# Patient Record
Sex: FEMALE | Race: WHITE | NOT HISPANIC OR LATINO | Employment: OTHER | URBAN - METROPOLITAN AREA
[De-identification: names, ages, dates, MRNs, and addresses within clinical notes are randomized per-mention and may not be internally consistent; named-entity substitution may affect disease eponyms.]

---

## 2017-03-09 ENCOUNTER — RADIANT APPOINTMENT (OUTPATIENT)
Dept: MAMMOGRAPHY | Facility: CLINIC | Age: 47
End: 2017-03-09
Attending: FAMILY MEDICINE
Payer: COMMERCIAL

## 2017-03-09 DIAGNOSIS — Z12.31 VISIT FOR SCREENING MAMMOGRAM: ICD-10-CM

## 2017-03-09 PROCEDURE — G0202 SCR MAMMO BI INCL CAD: HCPCS | Mod: TC

## 2017-03-09 NOTE — PROGRESS NOTES
Steffanie Ahn  Is out of the office and I am reviewing your results  Your mammogram was normal.  I do recommend annual mammography.   Please call or MyChart my office with any questions or concerns.    Татьяна Pizano, PAC

## 2017-03-13 ENCOUNTER — OFFICE VISIT (OUTPATIENT)
Dept: FAMILY MEDICINE | Facility: CLINIC | Age: 47
End: 2017-03-13
Payer: COMMERCIAL

## 2017-03-13 VITALS
HEART RATE: 64 BPM | BODY MASS INDEX: 27.08 KG/M2 | TEMPERATURE: 98.2 F | HEIGHT: 68 IN | WEIGHT: 178.7 LBS | RESPIRATION RATE: 16 BRPM | OXYGEN SATURATION: 100 % | SYSTOLIC BLOOD PRESSURE: 112 MMHG | DIASTOLIC BLOOD PRESSURE: 78 MMHG

## 2017-03-13 DIAGNOSIS — N30.90 RECURRENT CYSTITIS: ICD-10-CM

## 2017-03-13 DIAGNOSIS — Z12.4 SCREENING FOR MALIGNANT NEOPLASM OF CERVIX: ICD-10-CM

## 2017-03-13 DIAGNOSIS — F32.5 MAJOR DEPRESSION IN COMPLETE REMISSION (H): ICD-10-CM

## 2017-03-13 DIAGNOSIS — Z00.00 ROUTINE GENERAL MEDICAL EXAMINATION AT A HEALTH CARE FACILITY: Primary | ICD-10-CM

## 2017-03-13 PROCEDURE — 87624 HPV HI-RISK TYP POOLED RSLT: CPT | Performed by: FAMILY MEDICINE

## 2017-03-13 PROCEDURE — G0145 SCR C/V CYTO,THINLAYER,RESCR: HCPCS | Performed by: FAMILY MEDICINE

## 2017-03-13 PROCEDURE — 99396 PREV VISIT EST AGE 40-64: CPT | Performed by: FAMILY MEDICINE

## 2017-03-13 RX ORDER — CEPHALEXIN 500 MG/1
500 CAPSULE ORAL 3 TIMES DAILY
Qty: 30 CAPSULE | Refills: 5 | Status: SHIPPED | OUTPATIENT
Start: 2017-03-13 | End: 2018-03-13

## 2017-03-13 RX ORDER — CITALOPRAM HYDROBROMIDE 40 MG/1
40 TABLET ORAL DAILY
Qty: 90 TABLET | Refills: 3 | Status: SHIPPED | OUTPATIENT
Start: 2017-03-13 | End: 2018-03-13

## 2017-03-13 RX ORDER — CEPHALEXIN 500 MG/1
CAPSULE ORAL PRN
COMMUNITY
End: 2017-07-07

## 2017-03-13 ASSESSMENT — PAIN SCALES - GENERAL: PAINLEVEL: NO PAIN (0)

## 2017-03-13 NOTE — PROGRESS NOTES
SUBJECTIVE:     CC: Ashly Tan is an 47 year old woman who presents for preventive health visit.     [facility] Hpi Annual Exam   Question    3/10/2017  6:56 AM CST   Please respond to the following questions regarding your annual exam.  These will be reviewed by your provider at the time of your appointment and will become a permanent part of your medical record.      Do you get at least 3 servings of foods that have calcium each day (dairy, green leafy vegetables, etc)?  Yes   Have you had an eye exam in the past two years?  Yes   Do you see a dentist twice per year?  Yes   Do you have sleep apnea, excessive snoring or daytime drowsiness?  Daytime drowsiness   Do you have a special diet?   regular (no restrictions)   Outside of work, how many days during the week do you exercise?  2-3 days/week   Do you have any problems taking medications regularly?  No   Side effects from medication:  None   Do you have any additional concerns to address?  No   Have there been any changes to your medical or surgical history?  No   Have there been any changes to your social history? (tobacco use, alcohol use, sexual activity)  No   Have there been any changes in your allergies?  No   Have there been any changes or updates to the medications you take?  No   Do any of your medications need to be refilled?  Yes   Over the past 2 weeks, how often have you been bothered by any of the following problems?     Little interest or pleasure in doing things  Not at all   Feeling down, depressed or hopeless  Several days   PHQ-2 Score (range: 0 - 6)  1   Outside of work, approximately how many minutes a day do you exercise?  Greater than 60 minutes         Today's PHQ-2 Score:   PHQ-2 ( 1999 [employer]) 3/10/2017 3/7/2016   Q1: Little interest or pleasure in doing things - 0   Q2: Feeling down, depressed or hopeless - 0   PHQ-2 Score - 0   Little interest or pleasure in doing things Not at all -   Feeling down, depressed or hopeless Several  days -   PHQ-2 Score 1 -       Abuse: Current or Past(Physical, Sexual or Emotional)- No  Do you feel safe in your environment - Yes    Social History   Substance Use Topics     Smoking status: Never Smoker     Smokeless tobacco: Never Used     Alcohol use Yes     The patient does not drink >3 drinks per day nor >7 drinks per week.    Recent Labs   Lab Test  01/15/10   1133   CHOL  159   HDL  61   LDL  84   TRIG  69   CHOLHDLRATIO  2.6       Reviewed orders with patient.  Reviewed health maintenance and updated orders accordingly - Yes    Mammo Decision Support:  Patient under age 50, mutual decision reflected in health maintenance.      Pertinent mammograms are reviewed under the imaging tab.  History of abnormal Pap smear: NO - age 30-65 PAP every 5 years with negative HPV co-testing recommended    Reviewed and updated as needed this visit by clinical staff  Tobacco  Allergies  Meds  Med Hx  Surg Hx  Fam Hx  Soc Hx        Reviewed and updated as needed this visit by Provider        Here today for routine checkup  Doing well.  Reports no interval health concerns.    Her only issue is the start of the perimenopause. Cycle still regular but starting to have some mood changes. Trying some over-the-counter vitamin supplements    ROS:  C: NEGATIVE for fever, chills, change in weight  I: NEGATIVE for worrisome rashes, moles or lesions  E: NEGATIVE for vision changes or irritation  ENT: NEGATIVE for ear, mouth and throat problems  R: NEGATIVE for significant cough or SOB  B: NEGATIVE for masses, tenderness or discharge  CV: NEGATIVE for chest pain, palpitations or peripheral edema  GI: NEGATIVE for nausea, abdominal pain, heartburn, or change in bowel habits  : NEGATIVE for unusual urinary or vaginal symptoms. Periods are regular.  M: NEGATIVE for significant arthralgias or myalgia  N: NEGATIVE for weakness, dizziness or paresthesias  P: NEGATIVE for changes in mood or affect    Problem list, Medication list,  "Allergies, and Medical/Social/Surgical histories reviewed in [facility] and updated as appropriate.  Labs reviewed in EPIC  BP Readings from Last 3 Encounters:   03/13/17 112/78   03/07/16 116/72   01/06/16 118/74    Wt Readings from Last 3 Encounters:   03/13/17 81.1 kg (178 lb 11.2 oz)   03/07/16 81 kg (178 lb 8 oz)   01/06/16 80.1 kg (176 lb 8 oz)                  OBJECTIVE:     /78 (BP Location: Right arm, Patient Position: Right side, Cuff Size: Adult Regular)  Pulse 64  Temp 98.2  F (36.8  C) (Oral)  Resp 16  Ht 1.72 m (5' 7.7\")  Wt 81.1 kg (178 lb 11.2 oz)  LMP 02/26/2017 (Exact Date)  SpO2 100%  BMI 27.41 kg/m2  EXAM:  GENERAL: healthy, alert and no distress  EYES: Eyes grossly normal to inspection, PERRL and conjunctivae and sclerae normal  HENT: ear canals and TM's normal, nose and mouth without ulcers or lesions  NECK: no adenopathy, no asymmetry, masses, or scars and thyroid normal to palpation  RESP: lungs clear to auscultation - no rales, rhonchi or wheezes  CV: regular rate and rhythm, normal S1 S2, no S3 or S4, no murmur, click or rub, no peripheral edema and peripheral pulses strong  ABDOMEN: soft, nontender, no hepatosplenomegaly, no masses and bowel sounds normal   (female): normal female external genitalia, normal urethral meatus, vaginal mucosa pink, moist, well rugated, and normal cervix/adnexa/uterus without masses or discharge  MS: no gross musculoskeletal defects noted, no edema  SKIN: no suspicious lesions or rashes  NEURO: Normal strength and tone, mentation intact and speech normal  PSYCH: mentation appears normal, affect normal/bright    ASSESSMENT/PLAN:     1. Routine general medical examination at a health care facility  Health care maintenance up to date otherwise   - Lipid panel reflex to direct LDL; Future  - GLUCOSE; Future    2. Screening for malignant neoplasm of cervix    - Pap imaged thin layer screen with HPV - recommended age 30 - 65 years (select HPV order below)  - HPV " "High Risk Types DNA Cervical    3. Major depression in complete remission (H)  Doing well on current dosage. We'll continue for now. If menopausal symptoms worsen could consider changing to or adding Effexor  - citalopram (CELEXA) 40 MG tablet; Take 1 tablet (40 mg) by mouth daily  Dispense: 90 tablet; Refill: 3    4. Recurrent cystitis    - cephALEXin (KEFLEX) 500 MG capsule; Take 1 capsule (500 mg) by mouth 3 times daily  Dispense: 30 capsule; Refill: 5    COUNSELING:   Reviewed preventive health counseling, as reflected in patient instructions       Regular exercise       Healthy diet/nutrition       Vision screening         reports that she has never smoked. She has never used smokeless tobacco.    Estimated body mass index is 27.41 kg/(m^2) as calculated from the following:    Height as of this encounter: 1.72 m (5' 7.7\").    Weight as of this encounter: 81.1 kg (178 lb 11.2 oz).       Counseling Resources:  ATP IV Guidelines  Pooled Cohorts Equation Calculator  Breast Cancer Risk Calculator  FRAX Risk Assessment  ICSI Preventive Guidelines  Dietary Guidelines for Americans, 2010  DXY's MyPlate  ASA Prophylaxis  Lung CA Screening    Amber Ahn MD  [facility]  Answers for HPI/ROS submitted by the patient on 3/10/2017   Annual Exam:  Getting at least 3 servings of Calcium per day:: Yes  Bi-annual eye exam:: Yes  Dental care twice a year:: Yes  Sleep apnea or symptoms of sleep apnea:: Daytime drowsiness  Diet:: Regular (no restrictions)  Frequency of exercise:: 2-3 days/week  Taking medications regularly:: Yes  Medication side effects:: None  Additional concerns today:: No  Q1: Little interest or pleasure in doing things: 0=Not at all  Q2: Feeling down, depressed or hopeless: 1=Several days  PHQ-2 Score: 1  Duration of exercise:: Greater than 60 minutes    "

## 2017-03-13 NOTE — MR AVS SNAPSHOT
After Visit Summary   3/13/2017    Ashly Tan    MRN: [id number]           Patient Information     Date Of Birth          1970        Visit Information        Provider Department      3/13/2017 2:00 PM Amber Ahn MD [facility]        Today's Diagnoses     Routine general medical examination at a health care facility    -  1    Screening for malignant neoplasm of cervix        Major depression in complete remission (H)        Recurrent cystitis          Care Instructions      Preventive Health Recommendations  Female Ages 40 to 49    Yearly exam:     See your health care provider every year in order to  1. Review health changes.   2. Discuss preventive care.    3. Review your medicines if your doctor prescribed any.      Get a Pap test every three years (unless you have an abnormal result and your provider advises testing more often).      If you get Pap tests with HPV test, you only need to test every 5 years, unless you have an abnormal result. You do not need a Pap test if your uterus was removed (hysterectomy) and you have not had cancer.      You should be tested each year for STDs (sexually transmitted diseases), if you're at risk.       Ask your doctor if you should have a mammogram.      Have a colonoscopy (test for colon cancer) if someone in your family has had colon cancer or polyps before age 50.       Have a cholesterol test every 5 years.       Have a diabetes test (fasting glucose) after age 45. If you are at risk for diabetes, you should have this test every 3 years.    Shots: Get a flu shot each year. Get a tetanus shot every 10 years.     Nutrition:     Eat at least 5 servings of fruits and vegetables each day.    Eat whole-grain bread, whole-wheat pasta and brown rice instead of white grains and rice.    Talk to your provider about Calcium and Vitamin D.     Lifestyle    Exercise at least 150 minutes a week (an average of 30 minutes a day, 5  days a week). This will help you control your weight and prevent disease.    Limit alcohol to one drink per day.    No smoking.     Wear sunscreen to prevent skin cancer.    See your dentist every six months for an exam and cleaning.        Follow-ups after your visit        Follow-up notes from your care team     Return in about 1 year (around 3/13/2018).      Future tests that were ordered for you today     Open Future Orders        Priority Expected Expires Ordered    Lipid panel reflex to direct LDL Routine 3/14/2017 8/13/2017 3/13/2017    GLUCOSE Routine 3/14/2017 8/13/2017 3/13/2017            Who to contact     If you have questions or need follow up information about today's clinic visit or your schedule please contact [facility] directly at [phone].  Normal or non-critical lab and imaging results will be communicated to you by MyChart, letter or phone within 4 business days after the clinic has received the results. If you do not hear from us within 7 days, please contact the clinic through [employer] or phone. If you have a critical or abnormal lab result, we will notify you by phone as soon as possible.  Submit refill requests through [employer] or call your pharmacy and they will forward the refill request to us. Please allow 3 business days for your refill to be completed.          Additional Information About Your Visit        [employer] Information     [employer] gives you secure access to your electronic health record. If you see a primary care provider, you can also send messages to your care team and make appointments. If you have questions, please call your primary care clinic.  If you do not have a primary care provider, please call [phone] and they will assist you.        Care EveryWhere ID     This is your Care EveryWhere ID. This could be used by other organizations to access your [facility] medical records  [id number]        Your Vitals Were     Pulse Temperature Respirations  "Height Last Period Pulse Oximetry    64 98.2  F (36.8  C) (Oral) 16 1.72 m (5' 7.7\") 02/26/2017 (Exact Date) 100%    BMI (Body Mass Index)                   27.41 kg/m2            Blood Pressure from Last 3 Encounters:   03/13/17 112/78   03/07/16 116/72   01/06/16 118/74    Weight from Last 3 Encounters:   03/13/17 81.1 kg (178 lb 11.2 oz)   03/07/16 81 kg (178 lb 8 oz)   01/06/16 80.1 kg (176 lb 8 oz)              We Performed the Following     DEPRESSION ACTION PLAN (DAP) Order [[id number]]     HPV High Risk Types DNA Cervical     Pap imaged thin layer screen with HPV - recommended age 30 - 65 years (select HPV order below)          Today's Medication Changes          These changes are accurate as of: 3/13/17  3:19 PM.  If you have any questions, ask your nurse or doctor.               These medicines have changed or have updated prescriptions.        Dose/Directions    * cephALEXin 500 MG capsule   Commonly known as:  KEFLEX   This may have changed:  Another medication with the same name was added. Make sure you understand how and when to take each.   Changed by:  Amber Ahn MD        Take by mouth as needed   Refills:  0       * cephALEXin 500 MG capsule   Commonly known as:  KEFLEX   This may have changed:  You were already taking a medication with the same name, and this prescription was added. Make sure you understand how and when to take each.   Used for:  Recurrent cystitis   Changed by:  Amber Ahn MD        Dose:  500 mg   Take 1 capsule (500 mg) by mouth 3 times daily   Quantity:  30 capsule   Refills:  5       * Notice:  This list has 2 medication(s) that are the same as other medications prescribed for you. Read the directions carefully, and ask your doctor or other care provider to review them with you.         Where to get your medicines      These medications were sent to [facility] - [place], [place] - [address]  [address], [place] " [place] [address]     Phone:  [phone]     cephALEXin 500 MG capsule    citalopram 40 MG tablet                Primary Care Provider Office Phone # Fax #    Amber Vladimir Ahn [phone]       [facility] [address] [place] [id number]        Thank you!     Thank you for choosing [facility]  for your care. Our goal is always to provide you with excellent care. Hearing back from our patients is one way we can continue to improve our services. Please take a few minutes to complete the written survey that you may receive in the mail after your visit with us. Thank you!             Your Updated Medication List - Protect others around you: Learn how to safely use, store and throw away your medicines at www.disposemymeds.org.          This list is accurate as of: 3/13/17  3:19 PM.  Always use your most recent med list.                   Brand Name Dispense Instructions for use    ADVIL COLD/SINUS PO      prn       ALPRAZolam 0.5 MG tablet    XANAX    60 tablet    Take 1 tablet (0.5 mg) by mouth 3 times daily as needed for anxiety       * cephALEXin 500 MG capsule    KEFLEX     Take by mouth as needed       * cephALEXin 500 MG capsule    KEFLEX    30 capsule    Take 1 capsule (500 mg) by mouth 3 times daily       citalopram 40 MG tablet    celeXA    90 tablet    Take 1 tablet (40 mg) by mouth daily       * Notice:  This list has 2 medication(s) that are the same as other medications prescribed for you. Read the directions carefully, and ask your doctor or other care provider to review them with you.

## 2017-03-13 NOTE — NURSING NOTE
"Chief Complaint   Patient presents with     Physical     non-fasting       Initial /78 (BP Location: Right arm, Patient Position: Right side, Cuff Size: Adult Regular)  Pulse 64  Temp 98.2  F (36.8  C) (Oral)  Resp 16  Ht 1.72 m (5' 7.7\")  Wt 81.1 kg (178 lb 11.2 oz)  LMP 02/26/2017 (Exact Date)  SpO2 100%  BMI 27.41 kg/m2 Estimated body mass index is 27.41 kg/(m^2) as calculated from the following:    Height as of this encounter: 1.72 m (5' 7.7\").    Weight as of this encounter: 81.1 kg (178 lb 11.2 oz).  Medication Reconciliation: complete     Will Kamlesh MEJIA       "

## 2017-03-15 LAB
COPATH REPORT: NORMAL
PAP: NORMAL

## 2017-03-17 LAB
FINAL DIAGNOSIS: NORMAL
HPV HR 12 DNA CVX QL NAA+PROBE: NEGATIVE
HPV16 DNA SPEC QL NAA+PROBE: NEGATIVE
HPV18 DNA SPEC QL NAA+PROBE: NEGATIVE
SPECIMEN DESCRIPTION: NORMAL

## 2017-07-07 ENCOUNTER — OFFICE VISIT (OUTPATIENT)
Dept: FAMILY MEDICINE | Facility: CLINIC | Age: 47
End: 2017-07-07
Payer: COMMERCIAL

## 2017-07-07 VITALS
HEART RATE: 62 BPM | RESPIRATION RATE: 16 BRPM | DIASTOLIC BLOOD PRESSURE: 80 MMHG | SYSTOLIC BLOOD PRESSURE: 116 MMHG | OXYGEN SATURATION: 100 % | BODY MASS INDEX: 27.01 KG/M2 | HEIGHT: 68 IN | TEMPERATURE: 98.2 F | WEIGHT: 178.2 LBS

## 2017-07-07 DIAGNOSIS — N63.0 LUMP OR MASS IN BREAST: Primary | ICD-10-CM

## 2017-07-07 PROCEDURE — 99213 OFFICE O/P EST LOW 20 MIN: CPT | Performed by: FAMILY MEDICINE

## 2017-07-07 ASSESSMENT — ANXIETY QUESTIONNAIRES
IF YOU CHECKED OFF ANY PROBLEMS ON THIS QUESTIONNAIRE, HOW DIFFICULT HAVE THESE PROBLEMS MADE IT FOR YOU TO DO YOUR WORK, TAKE CARE OF THINGS AT HOME, OR GET ALONG WITH OTHER PEOPLE: NOT DIFFICULT AT ALL
7. FEELING AFRAID AS IF SOMETHING AWFUL MIGHT HAPPEN: NOT AT ALL
6. BECOMING EASILY ANNOYED OR IRRITABLE: NOT AT ALL
2. NOT BEING ABLE TO STOP OR CONTROL WORRYING: NOT AT ALL
5. BEING SO RESTLESS THAT IT IS HARD TO SIT STILL: NOT AT ALL
1. FEELING NERVOUS, ANXIOUS, OR ON EDGE: NOT AT ALL
GAD7 TOTAL SCORE: 0
3. WORRYING TOO MUCH ABOUT DIFFERENT THINGS: NOT AT ALL

## 2017-07-07 ASSESSMENT — PATIENT HEALTH QUESTIONNAIRE - PHQ9: 5. POOR APPETITE OR OVEREATING: NOT AT ALL

## 2017-07-07 ASSESSMENT — PAIN SCALES - GENERAL: PAINLEVEL: NO PAIN (0)

## 2017-07-07 NOTE — NURSING NOTE
"Chief Complaint   Patient presents with     Breast Problem       Initial /80 (BP Location: Right arm, Patient Position: Right side, Cuff Size: Adult Regular)  Pulse 62  Temp 98.2  F (36.8  C) (Oral)  Resp 16  Ht 1.72 m (5' 7.7\")  Wt 80.8 kg (178 lb 3.2 oz)  LMP 06/15/2017 (Exact Date)  SpO2 100%  BMI 27.34 kg/m2 Estimated body mass index is 27.34 kg/(m^2) as calculated from the following:    Height as of this encounter: 1.72 m (5' 7.7\").    Weight as of this encounter: 80.8 kg (178 lb 3.2 oz).  Medication Reconciliation: complete     Will Kamlesh MEJIA      "

## 2017-07-07 NOTE — MR AVS SNAPSHOT
"              After Visit Summary   7/7/2017    Ashly Tan    MRN: [id number]           Patient Information     Date Of Birth          1970        Visit Information        Provider Department      7/7/2017 3:00 PM Amber Ahn MD [facility]        Today's Diagnoses     Lump or mass in breast    -  1       Follow-ups after your visit        Follow-up notes from your care team     Return if symptoms worsen or fail to improve.      Who to contact     If you have questions or need follow up information about today's clinic visit or your schedule please contact [facility] directly at [phone].  Normal or non-critical lab and imaging results will be communicated to you by MyChart, letter or phone within 4 business days after the clinic has received the results. If you do not hear from us within 7 days, please contact the clinic through [employer] or phone. If you have a critical or abnormal lab result, we will notify you by phone as soon as possible.  Submit refill requests through [employer] or call your pharmacy and they will forward the refill request to us. Please allow 3 business days for your refill to be completed.          Additional Information About Your Visit        MyChart Information     [employer] gives you secure access to your electronic health record. If you see a primary care provider, you can also send messages to your care team and make appointments. If you have questions, please call your primary care clinic.  If you do not have a primary care provider, please call [phone] and they will assist you.        Care EveryWhere ID     This is your Care EveryWhere ID. This could be used by other organizations to access your [place] medical records  [id number]        Your Vitals Were     Pulse Temperature Respirations Height Last Period Pulse Oximetry    62 98.2  F (36.8  C) (Oral) 16 1.72 m (5' 7.7\") 06/15/2017 (Exact Date) 100%    BMI (Body Mass " Index)                   27.34 kg/m2            Blood Pressure from Last 3 Encounters:   07/07/17 116/80   03/13/17 112/78   03/07/16 116/72    Weight from Last 3 Encounters:   07/07/17 80.8 kg (178 lb 3.2 oz)   03/13/17 81.1 kg (178 lb 11.2 oz)   03/07/16 81 kg (178 lb 8 oz)              Today, you had the following     No orders found for display       Primary Care Provider Office Phone # Fax #    Amber Vladimir Ahn [phone]       [address] [address]        Equal Access to Services     LUCILLE JANG : Hadii ly arizao Sonick, waaxda luqadaha, qaybta kaalmada adeberonicayada, immanuel laboy. So [facility] [phone].    ATENCIÓN: Si habla español, tiene a toscano disposición servicios gratuitos de asistencia lingüística. Llame al [phone].    We comply with applicable federal civil rights laws and [place] laws. We do not discriminate on the basis of race, color, national origin, age, disability sex, sexual orientation or gender identity.            Thank you!     Thank you for choosing [facility]  for your care. Our goal is always to provide you with excellent care. Hearing back from our patients is one way we can continue to improve our services. Please take a few minutes to complete the written survey that you may receive in the mail after your visit with us. Thank you!             Your Updated Medication List - Protect others around you: Learn how to safely use, store and throw away your medicines at www.disposemymeds.org.          This list is accurate as of: 7/7/17 11:59 PM.  Always use your most recent med list.                   Brand Name Dispense Instructions for use Diagnosis    ADVIL COLD/SINUS PO      prn        ALPRAZolam 0.5 MG tablet    XANAX    60 tablet    Take 1 tablet (0.5 mg) by mouth 3 times daily as needed for anxiety    Major depression in complete remission (H)       cephALEXin  500 MG capsule    KEFLEX    30 capsule    Take 1 capsule (500 mg) by mouth 3 times daily    Recurrent cystitis       citalopram 40 MG tablet    celeXA    90 tablet    Take 1 tablet (40 mg) by mouth daily    Major depression in complete remission (H)

## 2017-07-07 NOTE — PROGRESS NOTES
"  SUBJECTIVE:                                                    Ashly Tan is a 47 year old female who presents to clinic today for the following health issues:      1. Breast Concern - R breast. For the past week   - pt states has had thickening on L breast   - ridge on bottom, L side of breast   - no pain, itching, redness - just hard    SUBJECTIVE:  Here with the above.  Noted on routine monthly self exam.  No symptoms.  Just an area that seems different.  No discharge.    Review of systems otherwise negative.  Past medical, family, and social history reviewed and updated in chart.    OBJECTIVE:  /80 (BP Location: Right arm, Patient Position: Right side, Cuff Size: Adult Regular)  Pulse 62  Temp 98.2  F (36.8  C) (Oral)  Resp 16  Ht 1.72 m (5' 7.7\")  Wt 80.8 kg (178 lb 3.2 oz)  LMP 06/15/2017 (Exact Date)  SpO2 100%  BMI 27.34 kg/m2  Alert, pleasant, upbeat, and in no apparent discomfort.  S1 and S2 normal, no murmurs, clicks, gallops or rubs. Regular rate and rhythm. Chest is clear; no wheezes or rales. No edema or JVD.  Breasts are symmetric.  No dominant, discrete, fixed  or suspicious masses are noted.  Mild symmetric fibrocystic densities are noted in both upper outer quadrants. No skin or nipple changes or axillary nodes. Self exam is taught and encouraged. Mammogram - is up to date.  Past labs reviewed with the patient.      ASSESSMENT / PLAN:  (N63) Lump or mass in breast  (primary encounter diagnosis)  Comment: nothing pathologic.  Reassured   Plan:     Follow up as needed   S. Vladimir Ahn MD    (Chart documentation completed in part with Dragon voice-recognition software.  Even though reviewed some grammatical, spelling, and word errors may remain.)       "

## 2017-07-08 ASSESSMENT — PATIENT HEALTH QUESTIONNAIRE - PHQ9: SUM OF ALL RESPONSES TO PHQ QUESTIONS 1-9: 0

## 2017-07-08 ASSESSMENT — ANXIETY QUESTIONNAIRES: GAD7 TOTAL SCORE: 0

## 2017-10-30 ENCOUNTER — MYC MEDICAL ADVICE (OUTPATIENT)
Dept: FAMILY MEDICINE | Facility: CLINIC | Age: 47
End: 2017-10-30

## 2017-10-30 DIAGNOSIS — R92.30 BREAST DENSITY: Primary | ICD-10-CM

## 2017-11-27 ENCOUNTER — TELEPHONE (OUTPATIENT)
Dept: SURGERY | Facility: CLINIC | Age: 47
End: 2017-11-27

## 2017-11-27 NOTE — TELEPHONE ENCOUNTER
PREVISIT INFORMATION                                                    Ashly Tan is scheduled for future visit with Dr. Camacho on 11/30/17 at the [facility].    Reason for visit: Breast density/mass right breast  Referring provider: Amber Ahn MD  Have images been done? Yes   Location: [facility] in [place]   Date: 3/9/17  Previous pathology reports? No  Have previous office records been requested? No, available in Epic  Has the patient seen Dr. Camacho in the past? (for old records): No    REVIEW                                                      New patient packet mailed to patient: No, to be given at check in  Medication reconciliation complete: No    PLAN/FOLLOW-UP NEEDED                                                      Previsit review complete.  Patient will see provider at future scheduled appointment.     Writer called and spoke with patient with appointment reminder.    Patient Reminders Given:    Informed patient to bring an updated list of allergies, medications, pharmacy details and insurance information. Directed patient to come to the 2nd floor, check-in #4 for their appointment. Informed patient to call back if appointment needs to be cancelled or rescheduled at [phone].    Reminded patient to bring any outside records regarding this appointment or have them faxed to clinic at [phone].    Keshia Akbar LPN

## 2017-11-30 ENCOUNTER — OFFICE VISIT (OUTPATIENT)
Dept: SURGERY | Facility: CLINIC | Age: 47
End: 2017-11-30
Attending: FAMILY MEDICINE
Payer: COMMERCIAL

## 2017-11-30 VITALS
WEIGHT: 180.5 LBS | DIASTOLIC BLOOD PRESSURE: 77 MMHG | BODY MASS INDEX: 26.73 KG/M2 | SYSTOLIC BLOOD PRESSURE: 128 MMHG | HEIGHT: 69 IN | HEART RATE: 71 BPM

## 2017-11-30 DIAGNOSIS — N60.19 FIBROCYSTIC BREAST CHANGES, UNSPECIFIED LATERALITY: Primary | ICD-10-CM

## 2017-11-30 PROCEDURE — 99203 OFFICE O/P NEW LOW 30 MIN: CPT | Performed by: SURGERY

## 2017-11-30 NOTE — LETTER
2017    Re: Ashly Tan - 1970    HISTORY OF PRESENT ILLNESS:  Ashly Tan is a 47 year old female who is seen in consultation at the request of  Dr. SHAHEED Ahn for evaluation of breast density.  Ashly has noted no breast changes, but when she has her mammograms, she is always told that her breasts are dense and other imaging modalities have been suggested.  She is unclear as to what is the best way to screen her breasts and would like recommendations.  Ashly has never had a breast biopsy or cyst aspiration.  She did have an ultrasound a number of years ago for a lump she self-palpated and no abnormality was found.  Mammograms from 3/9/17 were negative by report and my review.  She is generally very healthy.     Ashly reached menarche at age 13.  She is  having had her first pregnancy at age 25.  She tried breastfeeding, but was unsuccessful.  She continues to have regular periods and is not on BCP's or other hormonal treatment/supplement.  Ashly states that she is experiencing perimenopausal symptoms.  FH is significant for ovarian cancer in a maternal aunt around age 70.  There is no FH of breast, colon, uterine, prostate or pancreatic CA.  Ashly exercises regularly.  She does not smoke and drinks moderately.     REVIEW OF SYSTEMS:  Constitutional:  Negative for chills, fatigue, fever and weight change.  Eyes:  Negative for blurred vision, eye pain and photophobia.  ENT:  Negative for hearing problems, ENT pain, congestion, rhinorrhea, epistaxis, hoarseness and dental problems.  Cardiovascular:  Negative for chest pain, palpitations, tachycardia, orthopnea and edema.  Respiratory:  Negative for cough, dyspnea and hemoptysis.  Gastrointestinal:  Negative for abdominal pain, heartburn, constipation, diarrhea and stool changes.  Musculoskeletal:  Negative for arthralgias, back pain and myalgias.  Integumentary/Breast:  See HPI.      Vitals: /77 (BP Location:  "Left arm)  Pulse 71  Ht 1.753 m (5' 9\")  Wt 81.9 kg (180 lb 8 oz)  BMI 26.66 kg/m2  BMI= Body mass index is 26.66 kg/(m^2).               EXAM:  GENERAL: healthy, alert and no distress   BREAST:  The breasts are large and appear symmetric with no overlying skin changes.  The nipples are normal bilaterally.  There is no dimpling or thickening of the skin.  No mass is appreciated in either breast.  The breast tissue is generally pretty soft with significant increased density and lobularity in the upper outer quadrants.  There is also a soft fullness at 12-1 o'clock high in the right breast (lipoma?).  There is no axillary or supraclavicular lymphadenopathy.  CARDIOVASCULAR:  No edema or JVD.  RESPIRATORY: negative findings: no chest deformities noted, no chest wall tenderness, breathing is unlabored.  NECK: Neck supple. No adenopathy.   SKIN: No suspicious lesions or rashes  LYMPH: Normal cervical lymph nodes     ASSESSMENT:  Ashly Tan has moderate density of the breast tissue on mammograms which appears fairly symmetric.  On exam, most of the density is in the upper outer breasts and feels completely benign. The fullness in the right upper breast feels like a lipoma underlying the breast tissue and has that appearance on mammogram.  We reviewed the exam findings.     We talked about screening and what it means when the mammogram letter talks about breast density. I explained that the radiologists are required by law to tell patients about the density of their breasts. Despite the density, Ashly mammograms pretty well.  I told her that 3D mammograms would probably be of use in her screening since her tissue is dense and the tomosynthesis adds sensitivity.  If something is found on exam or mammogram, then ultrasound could be used as a diagnostic tool.     PLAN:  I recommended that Ashly continue to have annual screening mammograms and annual clinical breast exams.  She has been scheduled for mammograms " with tomosynthesis in March 2018.  She will follow up here are needed.     Xuan Camacho MD

## 2017-11-30 NOTE — NURSING NOTE
"Ashly Tan's goals for this visit include: dense breasts  She requests these members of her care team be copied on today's visit information: no    PCP: Amber Ahn    Referring Provider:  Amber Ahn MD  [address] [place] [address]    Chief Complaint   Patient presents with     Consult     dense breasts       Initial There were no vitals taken for this visit. Estimated body mass index is 27.34 kg/(m^2) as calculated from the following:    Height as of 7/7/17: 1.72 m (5' 7.7\").    Weight as of 7/7/17: 80.8 kg (178 lb 3.2 oz).  Medication Reconciliation: complete    Do you need any medication refills at today's visit? No    Keshia Akbar LPN      "

## 2017-11-30 NOTE — MR AVS SNAPSHOT
"              After Visit Summary   11/30/2017    Ashly Tan    MRN: [id number]           Patient Information     Date Of Birth          1970        Visit Information        Provider Department      11/30/2017 1:20 PM Xuan Camacho MD [facility]        Today's Diagnoses     Fibrocystic breast changes, unspecified laterality    -  1       Follow-ups after your visit        Your next 10 appointments already scheduled     Mar 12, 2018  1:00 PM CDT   (Arrive by 12:45 PM)   MA SCREENING BILATERAL W/ AMY with MGMA2, MG MA TECH   [facility] ([facility])    [id number] [place] [id number] [phone]           Three-dimensional (3D) mammograms are available at [place] locations in [facility], [facility], [facility], and [place]. [facility] locations include [place] [facility] in [place]. Benefits of 3D mammograms include: - Improved rate of cancer detection - Decreases your chance of having to go back for more tests, which means fewer: - \"False-positive\" results (This means that there is an abnormal area but it isn't cancer.) - Invasive testing procedures, such as a biopsy or surgery - Can provide clearer images of the breast if you have dense breast tissue. 3D mammography is an optional exam that anyone can have with a 2D mammogram. It doesn't replace or take the place of a 2D mammogram. 2D mammograms remain an effective screening test for all women.  Not all insurance companies cover the cost of a 3D mammogram. Check with your insurance.              Future tests that were ordered for you today     Open Future Orders        Priority Expected Expires Ordered    MA Screen Bilateral w/Amy Routine  11/30/2018 11/30/2017            Who to contact     If you have questions or need follow up information about today's clinic visit or your schedule please contact [facility] " "[facility] directly at [phone].  Normal or non-critical lab and imaging results will be communicated to you by Voltarihart, letter or phone within 4 business days after the clinic has received the results. If you do not hear from us within 7 days, please contact the clinic through [employer] or phone. If you have a critical or abnormal lab result, we will notify you by phone as soon as possible.  Submit refill requests through Eye Phone or call your pharmacy and they will forward the refill request to us. Please allow 3 business days for your refill to be completed.          Additional Information About Your Visit        Eye Phone Information     Eye Phone gives you secure access to your electronic health record. If you see a primary care provider, you can also send messages to your care team and make appointments. If you have questions, please call your primary care clinic.  If you do not have a primary care provider, please call [phone] and they will assist you.      Eye Phone is an electronic gateway that provides easy, online access to your medical records. With Eye Phone, you can request a clinic appointment, read your test results, renew a prescription or communicate with your care team.     To access your existing account, please contact your [facility] or call [phone] for assistance.        Care EveryWhere ID     This is your Care EveryWhere ID. This could be used by other organizations to access your [facility] medical records  [id number]        Your Vitals Were     Pulse Height BMI (Body Mass Index)             71 1.753 m (5' 9\") 26.66 kg/m2          Blood Pressure from Last 3 Encounters:   11/30/17 128/77   07/07/17 116/80   03/13/17 112/78    Weight from Last 3 Encounters:   11/30/17 81.9 kg (180 lb 8 oz)   07/07/17 80.8 kg (178 lb 3.2 oz)   03/13/17 81.1 kg (178 lb 11.2 oz)               Primary Care Provider Office Phone # Fax #    Amber Ahn MD " [phone]       [address] [facility], [place] [address]        Equal Access to Services     MARSHA JANG : Hadii aad ku hadbrady Medrano, wamarelyda luqshin, qaalexta kaalmada angela, immanuel burchcarolyn laboy. So [facility] [phone].    ATENCIÓN: Si habla español, tiene a toscano disposición servicios gratuitos de asistencia lingüística. Llame al [phone].    We comply with applicable federal civil rights laws and [place] laws. We do not discriminate on the basis of race, color, national origin, age, disability, sex, sexual orientation, or gender identity.            Thank you!     Thank you for choosing [facility]  for your care. Our goal is always to provide you with excellent care. Hearing back from our patients is one way we can continue to improve our services. Please take a few minutes to complete the written survey that you may receive in the mail after your visit with us. Thank you!             Your Updated Medication List - Protect others around you: Learn how to safely use, store and throw away your medicines at www.disposemymeds.org.          This list is accurate as of: 11/30/17  2:13 PM.  Always use your most recent med list.                   Brand Name Dispense Instructions for use Diagnosis    ADVIL COLD/SINUS PO      prn        ALPRAZolam 0.5 MG tablet    XANAX    60 tablet    Take 1 tablet (0.5 mg) by mouth 3 times daily as needed for anxiety    Major depression in complete remission (H)       cephALEXin 500 MG capsule    KEFLEX    30 capsule    Take 1 capsule (500 mg) by mouth 3 times daily    Recurrent cystitis       citalopram 40 MG tablet    celeXA    90 tablet    Take 1 tablet (40 mg) by mouth daily    Major depression in complete remission (H)

## 2017-11-30 NOTE — PROGRESS NOTES
CHIEF COMPLAINT:  Chief Complaint   Patient presents with     Consult     dense breasts       HISTORY OF PRESENT ILLNESS:  Ashly Tan is a 47 year old female who is seen in consultation at the request of  Dr. SHAHEED Ahn for evaluation of breast density.  Ashly has noted no breast changes, but when she has her mammograms, she is always told that her breasts are dense and other imaging modalities have been suggested.  She is unclear as to what is the best way to screen her breasts and would like recommendations.  Ashly has never had a breast biopsy or cyst aspiration.  She did have an ultrasound a number of years ago for a lump she self-palpated and no abnormality was found.  Mammograms from 3/9/17 were negative by report and my review.  She is generally very healthy.    Ashly reached menarche at age 13.  She is  having had her first pregnancy at age 25.  She tried breastfeeding, but was unsuccessful.  She continues to have regular periods and is not on BCP's or other hormonal treatment/supplement.  Ashly states that she is experiencing perimenopausal symptoms.  FH is significant for ovarian cancer in a maternal aunt around age 70.  There is no FH of breast, colon, uterine, prostate or pancreatic CA.  Ashly exercises regularly.  She does not smoke and drinks moderately.    REVIEW OF SYSTEMS:  Constitutional:  Negative for chills, fatigue, fever and weight change.  Eyes:  Negative for blurred vision, eye pain and photophobia.  ENT:  Negative for hearing problems, ENT pain, congestion, rhinorrhea, epistaxis, hoarseness and dental problems.  Cardiovascular:  Negative for chest pain, palpitations, tachycardia, orthopnea and edema.  Respiratory:  Negative for cough, dyspnea and hemoptysis.  Gastrointestinal:  Negative for abdominal pain, heartburn, constipation, diarrhea and stool changes.  Musculoskeletal:  Negative for arthralgias, back pain and myalgias.  Integumentary/Breast:  See HPI.    Past  "Medical History:   Diagnosis Date     Allergic rhinitis     vs Mechanical Rhinitis     History of asthma     Occassional     Major depression in complete remission (H) 9/27/2011       Past Surgical History:   Procedure Laterality Date     NO HISTORY OF SURGERY         Family History   Problem Relation Age of Onset     DIABETES Paternal Grandfather      Asthma Sister      Circulatory Maternal Grandfather      stroke     CANCER Maternal Aunt 70     Ovarian       Social History   Substance Use Topics     Smoking status: Never Smoker     Smokeless tobacco: Never Used     Alcohol use Yes      Comment: 3-4 per week       Patient Active Problem List   Diagnosis     Allergic rhinitis     CARDIOVASCULAR SCREENING; LDL GOAL LESS THAN 160     Major depression in complete remission (H)     No Known Allergies  Current Outpatient Prescriptions   Medication Sig Dispense Refill     citalopram (CELEXA) 40 MG tablet Take 1 tablet (40 mg) by mouth daily 90 tablet 3     cephALEXin (KEFLEX) 500 MG capsule Take 1 capsule (500 mg) by mouth 3 times daily 30 capsule 5     ALPRAZolam (XANAX) 0.5 MG tablet Take 1 tablet (0.5 mg) by mouth 3 times daily as needed for anxiety 60 tablet 0     ADVIL COLD/SINUS PO prn       Vitals: /77 (BP Location: Left arm)  Pulse 71  Ht 1.753 m (5' 9\")  Wt 81.9 kg (180 lb 8 oz)  BMI 26.66 kg/m2  BMI= Body mass index is 26.66 kg/(m^2).    EXAM:  GENERAL: healthy, alert and no distress   BREAST:  The breasts are large and appear symmetric with no overlying skin changes.  The nipples are normal bilaterally.  There is no dimpling or thickening of the skin.  No mass is appreciated in either breast.  The breast tissue is generally pretty soft with significant increased density and lobularity in the upper outer quadrants.  There is also a soft fullness at 12-1 o'clock high in the right breast (lipoma?).  There is no axillary or supraclavicular lymphadenopathy.  CARDIOVASCULAR:  No edema or JVD.  RESPIRATORY: " negative findings: no chest deformities noted, no chest wall tenderness, breathing is unlabored.  NECK: Neck supple. No adenopathy.   SKIN: No suspicious lesions or rashes  LYMPH: Normal cervical lymph nodes    ASSESSMENT:  Ashly Tan has moderate density of the breast tissue on mammograms which appears fairly symmetric.  On exam, most of the density is in the upper outer breasts and feels completely benign.  The fullness in the right upper breast feels like a lipoma underlying the breast tissue and has that appearance on mammogram.  We reviewed the exam findings.    We talked about screening and what it means when the mammogram letter talks about breast density.  I explained that the radiologists are required by law to tell patients about the density of their breasts.  Despite the density, Ashly mammograms pretty well.  I told her that 3D mammograms would probably be of use in her screening since her tissue is dense and the tomosynthesis adds sensitivity.  If something is found on exam or mammogram, then ultrasound could be used as a diagnostic tool.    PLAN:  I recommended that Ashly continue to have annual screening mammograms and annual clinical breast exams.  She has been scheduled for mammograms with tomosynthesis in March 2018.  She will follow up here are needed.    Xuan Camacho MD    Please route or send letter to:  Primary Care Provider (PCP)

## 2018-01-12 ENCOUNTER — OFFICE VISIT (OUTPATIENT)
Dept: FAMILY MEDICINE | Facility: CLINIC | Age: 48
End: 2018-01-12
Payer: COMMERCIAL

## 2018-01-12 VITALS
HEIGHT: 69 IN | BODY MASS INDEX: 27.25 KG/M2 | SYSTOLIC BLOOD PRESSURE: 102 MMHG | HEART RATE: 68 BPM | WEIGHT: 184 LBS | TEMPERATURE: 98 F | OXYGEN SATURATION: 100 % | RESPIRATION RATE: 16 BRPM | DIASTOLIC BLOOD PRESSURE: 80 MMHG

## 2018-01-12 DIAGNOSIS — M79.601 RIGHT ARM PAIN: Primary | ICD-10-CM

## 2018-01-12 PROCEDURE — 99213 OFFICE O/P EST LOW 20 MIN: CPT | Performed by: FAMILY MEDICINE

## 2018-01-12 NOTE — NURSING NOTE
"Chief Complaint   Patient presents with     Arthritis     Arm pain        Initial Ht 1.753 m (5' 9\")  Wt 83.5 kg (184 lb)  LMP 12/27/2017  BMI 27.17 kg/m2 Estimated body mass index is 27.17 kg/(m^2) as calculated from the following:    Height as of this encounter: 1.753 m (5' 9\").    Weight as of this encounter: 83.5 kg (184 lb).  Medication Reconciliation: complete     Kareen Yan, Medical Assistant      "

## 2018-01-12 NOTE — MR AVS SNAPSHOT
"              After Visit Summary   1/12/2018    Ashly Tan    MRN: [id number]           Patient Information     Date Of Birth          1970        Visit Information        Provider Department      1/12/2018 3:00 PM Amber Ahn MD [facility]        Today's Diagnoses     Right arm pain    -  1       Follow-ups after your visit        Follow-up notes from your care team     Return if symptoms worsen or fail to improve.      Your next 10 appointments already scheduled     Mar 12, 2018  1:00 PM CDT   (Arrive by 12:45 PM)   MA SCREENING BILATERAL W/ AMY with MGMA2, MG MA TECH   [facility]    [address] [place] [id number] [phone]           Three-dimensional (3D) mammograms are available at [facility] in [facility], [place], [place], [facility], [facility], and [place]. [facility] locations include [place] [facility] in [place]. Benefits of 3D mammograms include: - Improved rate of cancer detection - Decreases your chance of having to go back for more tests, which means fewer: - \"False-positive\" results (This means that there is an abnormal area but it isn't cancer.) - Invasive testing procedures, such as a biopsy or surgery - Can provide clearer images of the breast if you have dense breast tissue. 3D mammography is an optional exam that anyone can have with a 2D mammogram. It doesn't replace or take the place of a 2D mammogram. 2D mammograms remain an effective screening test for all women.  Not all insurance companies cover the cost of a 3D mammogram. Check with your insurance.              Who to contact     If you have questions or need follow up information about today's clinic visit or your schedule please contact [facility] directly at [phone].  Normal or non-critical lab and imaging results will be communicated to you by Antione, " "letter or phone within 4 business days after the clinic has received the results. If you do not hear from us within 7 days, please contact the clinic through [employer] or phone. If you have a critical or abnormal lab result, we will notify you by phone as soon as possible.  Submit refill requests through [employer] or call your pharmacy and they will forward the refill request to us. Please allow 3 business days for your refill to be completed.          Additional Information About Your Visit        [employer] Information     [employer] gives you secure access to your electronic health record. If you see a primary care provider, you can also send messages to your care team and make appointments. If you have questions, please call your primary care clinic.  If you do not have a primary care provider, please call [phone] and they will assist you.        Care EveryWhere ID     This is your Care EveryWhere ID. This could be used by other organizations to access your [place] medical records  [id number]        Your Vitals Were     Pulse Temperature Respirations Height Last Period Pulse Oximetry    68 98  F (36.7  C) (Oral) 16 1.753 m (5' 9\") 12/27/2017 100%    BMI (Body Mass Index)                   27.17 kg/m2            Blood Pressure from Last 3 Encounters:   01/12/18 102/80   11/30/17 128/77   07/07/17 116/80    Weight from Last 3 Encounters:   01/12/18 83.5 kg (184 lb)   11/30/17 81.9 kg (180 lb 8 oz)   07/07/17 80.8 kg (178 lb 3.2 oz)              Today, you had the following     No orders found for display       Primary Care Provider Office Phone # Fax #    Amber Vladimir Ahn [phone] [address] [facility] [address]        Equal Access to Services     LUCILLE JANG : Gino Medrano, whitney farias, wily miller, immanuel laboy. So [facility] [phone].    ATENCIÓN: Si habla español, tiene a toscano disposición servicios gratuitos " de asistencia lingüística. Nelida willams [phone].    We comply with applicable federal civil rights laws and [place] laws. We do not discriminate on the basis of race, color, national origin, age, disability, sex, sexual orientation, or gender identity.            Thank you!     Thank you for choosing [facility]  for your care. Our goal is always to provide you with excellent care. Hearing back from our patients is one way we can continue to improve our services. Please take a few minutes to complete the written survey that you may receive in the mail after your visit with us. Thank you!             Your Updated Medication List - Protect others around you: Learn how to safely use, store and throw away your medicines at www.disposemymeds.org.          This list is accurate as of: 1/12/18 11:59 PM.  Always use your most recent med list.                   Brand Name Dispense Instructions for use Diagnosis    ADVIL COLD/SINUS PO      prn        ALPRAZolam 0.5 MG tablet    XANAX    60 tablet    Take 1 tablet (0.5 mg) by mouth 3 times daily as needed for anxiety    Major depression in complete remission (H)       CALCIUM PO           cephALEXin 500 MG capsule    KEFLEX    30 capsule    Take 1 capsule (500 mg) by mouth 3 times daily    Recurrent cystitis       citalopram 40 MG tablet    celeXA    90 tablet    Take 1 tablet (40 mg) by mouth daily    Major depression in complete remission (H)       IRON SUPPLEMENT PO           MAGNESIUM PO           vitamin B complex with vitamin C Tabs tablet      Take 1 tablet by mouth daily

## 2018-01-12 NOTE — PROGRESS NOTES
"  SUBJECTIVE:   Ashly Tan is a 47 year old female who presents to clinic today for the following health issues:      Joint Pain    Onset: Ongoing pain     Description:   Location: Right arm   Character: Sharp and Dull ache    Intensity: mild    Progression of Symptoms: better, worse, intermittent    Accompanying Signs & Symptoms:  Other symptoms: none    History:   Previous similar pain: no       Precipitating factors:   Trauma or overuse: no     Alleviating factors:  Improved by: ice and Ibuprofen    Therapies Tried and outcome: Ice and ibuprofen, not really helping.     SUBJECTIVE:  Here today with symptoms as above started 4-6 weeks ago without any preceding injury.  Pain primarily seems deep in her right elbow and is most affected by grasping and pulling.  But at times she feels it radiate down her forearm and up her upper arm.  Not numb or tingly and no weakness.  No neck symptoms.  She was assuming it would just go away but it has not.  Has not worsened however.    Review of systems otherwise negative.  Past medical, family, and social history reviewed and updated in chart.    OBJECTIVE:  /80 (BP Location: Right arm, Patient Position: Sitting, Cuff Size: Adult Regular)  Pulse 68  Temp 98  F (36.7  C) (Oral)  Resp 16  Ht 1.753 m (5' 9\")  Wt 83.5 kg (184 lb)  LMP 12/27/2017  SpO2 100%  BMI 27.17 kg/m2  Alert, pleasant, upbeat, and in no apparent discomfort.  S1 and S2 normal, no murmurs, clicks, gallops or rubs. Regular rate and rhythm. Chest is clear; no wheezes or rales. No edema or JVD.   RUE -full active range of motion throughout.  Normal muscle mass and C there is a little bit of tenderness deep  within her olecranon fossa but is hard to pinpoint a specific tendon.  Resisted finger flexion re-creates a little bit of the pain as well.  Past labs reviewed with the patient.     ASSESSMENT / PLAN:  (S43.797) Right arm pain  (primary encounter diagnosis)  Comment: I think this is most " consistent with a tendinitis.  More specific than generalized such as a tennis elbow.  But the principal is still the same and I discussed some stretching and strengthening exercises with the patient  Plan:     Follow up as needed -could consider referral to sports medicine  SKajal Ahn MD    (Chart documentation completed in part with Dragon voice-recognition software.  Even though reviewed some grammatical, spelling, and word errors may remain.)

## 2018-03-12 ENCOUNTER — RADIANT APPOINTMENT (OUTPATIENT)
Dept: MAMMOGRAPHY | Facility: CLINIC | Age: 48
End: 2018-03-12
Attending: SURGERY
Payer: COMMERCIAL

## 2018-03-12 DIAGNOSIS — N60.19 FIBROCYSTIC BREAST CHANGES, UNSPECIFIED LATERALITY: ICD-10-CM

## 2018-03-12 PROCEDURE — 77067 SCR MAMMO BI INCL CAD: CPT | Performed by: RADIOLOGY

## 2018-03-12 PROCEDURE — 77063 BREAST TOMOSYNTHESIS BI: CPT | Performed by: RADIOLOGY

## 2018-03-13 ENCOUNTER — OFFICE VISIT (OUTPATIENT)
Dept: FAMILY MEDICINE | Facility: CLINIC | Age: 48
End: 2018-03-13
Payer: COMMERCIAL

## 2018-03-13 VITALS
BODY MASS INDEX: 26.88 KG/M2 | SYSTOLIC BLOOD PRESSURE: 126 MMHG | WEIGHT: 181.5 LBS | RESPIRATION RATE: 18 BRPM | HEIGHT: 69 IN | OXYGEN SATURATION: 98 % | DIASTOLIC BLOOD PRESSURE: 82 MMHG | HEART RATE: 84 BPM | TEMPERATURE: 98.3 F

## 2018-03-13 DIAGNOSIS — N30.90 RECURRENT CYSTITIS: ICD-10-CM

## 2018-03-13 DIAGNOSIS — F32.5 MAJOR DEPRESSION IN COMPLETE REMISSION (H): ICD-10-CM

## 2018-03-13 DIAGNOSIS — Z00.00 ROUTINE GENERAL MEDICAL EXAMINATION AT A HEALTH CARE FACILITY: Primary | ICD-10-CM

## 2018-03-13 PROCEDURE — 99396 PREV VISIT EST AGE 40-64: CPT | Performed by: FAMILY MEDICINE

## 2018-03-13 RX ORDER — CITALOPRAM HYDROBROMIDE 40 MG/1
40 TABLET ORAL DAILY
Qty: 90 TABLET | Refills: 3 | Status: SHIPPED | OUTPATIENT
Start: 2018-03-13 | End: 2019-03-18

## 2018-03-13 RX ORDER — CEPHALEXIN 500 MG/1
500 CAPSULE ORAL 3 TIMES DAILY
Qty: 30 CAPSULE | Refills: 5 | Status: SHIPPED | OUTPATIENT
Start: 2018-03-13 | End: 2019-03-18

## 2018-03-13 ASSESSMENT — PAIN SCALES - GENERAL: PAINLEVEL: NO PAIN (0)

## 2018-03-13 NOTE — MR AVS SNAPSHOT
After Visit Summary   3/13/2018    Ashly Tan    MRN: [id number]           Patient Information     Date Of Birth          1970        Visit Information        Provider Department      3/13/2018 12:20 PM Amber Ahn MD [facility]        Today's Diagnoses     Routine general medical examination at a health care facility    -  1    Major depression in complete remission (H)        Recurrent cystitis          Care Instructions      Preventive Health Recommendations  Female Ages 40 to 49    Yearly exam:     See your health care provider every year in order to  1. Review health changes.   2. Discuss preventive care.    3. Review your medicines if your doctor prescribed any.      Get a Pap test every three years (unless you have an abnormal result and your provider advises testing more often).      If you get Pap tests with HPV test, you only need to test every 5 years, unless you have an abnormal result. You do not need a Pap test if your uterus was removed (hysterectomy) and you have not had cancer.      You should be tested each year for STDs (sexually transmitted diseases), if you're at risk.       Ask your doctor if you should have a mammogram.      Have a colonoscopy (test for colon cancer) if someone in your family has had colon cancer or polyps before age 50.       Have a cholesterol test every 5 years.       Have a diabetes test (fasting glucose) after age 45. If you are at risk for diabetes, you should have this test every 3 years.    Shots: Get a flu shot each year. Get a tetanus shot every 10 years.     Nutrition:     Eat at least 5 servings of fruits and vegetables each day.    Eat whole-grain bread, whole-wheat pasta and brown rice instead of white grains and rice.    Talk to your provider about Calcium and Vitamin D.     Lifestyle    Exercise at least 150 minutes a week (an average of 30 minutes a day, 5 days a week). This will help you control your  weight and prevent disease.    Limit alcohol to one drink per day.    No smoking.     Wear sunscreen to prevent skin cancer.    See your dentist every six months for an exam and cleaning.          Follow-ups after your visit        Follow-up notes from your care team     Return in about 1 year (around 3/13/2019).      Future tests that were ordered for you today     Open Future Orders        Priority Expected Expires Ordered    **Glucose FUTURE anytime Routine 3/13/2018 3/13/2019 3/13/2018    Lipid panel reflex to direct LDL Fasting Routine 3/13/2018 3/13/2019 3/13/2018            Who to contact     If you have questions or need follow up information about today's clinic visit or your schedule please contact [facility] directly at [phone].  Normal or non-critical lab and imaging results will be communicated to you by MyChart, letter or phone within 4 business days after the clinic has received the results. If you do not hear from us within 7 days, please contact the clinic through [employer] or phone. If you have a critical or abnormal lab result, we will notify you by phone as soon as possible.  Submit refill requests through [employer] or call your pharmacy and they will forward the refill request to us. Please allow 3 business days for your refill to be completed.          Additional Information About Your Visit        [employer] Information     [employer] gives you secure access to your electronic health record. If you see a primary care provider, you can also send messages to your care team and make appointments. If you have questions, please call your primary care clinic.  If you do not have a primary care provider, please call [phone] and they will assist you.        Care EveryWhere ID     This is your Care EveryWhere ID. This could be used by other organizations to access your [place] medical records  [id number]        Your Vitals Were     Pulse Temperature Respirations Height Last Period  "Pulse Oximetry    84 98.3  F (36.8  C) (Oral) 18 1.753 m (5' 9\") 02/23/2018 98%    BMI (Body Mass Index)                   26.8 kg/m2            Blood Pressure from Last 3 Encounters:   03/13/18 126/82   01/12/18 102/80   11/30/17 128/77    Weight from Last 3 Encounters:   03/13/18 82.3 kg (181 lb 8 oz)   01/12/18 83.5 kg (184 lb)   11/30/17 81.9 kg (180 lb 8 oz)                 Where to get your medicines      These medications were [place] [facility] [place] [address]  [address], [facility] [address]     Phone:  [phone]     cephALEXin 500 MG capsule    citalopram 40 MG tablet          Primary Care Provider Office Phone # Fax #    Amber Vladimir Ahn [phone] [address] [facility] [address]        Equal Access to Services     [facility]: Hadii aad ku hadasho Soomaali, waaxda luqadaha, qaybta kaalmada adeegyada, waxay janette harris . So [facility] [phone].    ATENCIÓN: Si habla español, tiene a toscano disposición servicios gratuitos de asistencia lingüística. Llame al [phone].    We comply with applicable federal civil rights laws and [place] laws. We do not discriminate on the basis of race, color, national origin, age, disability, sex, sexual orientation, or gender identity.            Thank you!     Thank you for choosing [facility]  for your care. Our goal is always to provide you with excellent care. Hearing back from our patients is one way we can continue to improve our services. Please take a few minutes to complete the written survey that you may receive in the mail after your visit with us. Thank you!             Your Updated Medication List - Protect others around you: Learn how to safely use, store and throw away your medicines at www.disposemymeds.org.          This list is accurate as of 3/13/18 12:38 PM.  Always use your most recent med list.                   " Brand Name Dispense Instructions for use Diagnosis    ADVIL COLD/SINUS PO      prn        ALPRAZolam 0.5 MG tablet    XANAX    60 tablet    Take 1 tablet (0.5 mg) by mouth 3 times daily as needed for anxiety    Major depression in complete remission (H)       CALCIUM PO           cephALEXin 500 MG capsule    KEFLEX    30 capsule    Take 1 capsule (500 mg) by mouth 3 times daily    Recurrent cystitis       citalopram 40 MG tablet    celeXA    90 tablet    Take 1 tablet (40 mg) by mouth daily    Major depression in complete remission (H)       IRON SUPPLEMENT PO           MAGNESIUM PO           vitamin B complex with vitamin C Tabs tablet      Take 1 tablet by mouth daily

## 2018-03-13 NOTE — NURSING NOTE
"Chief Complaint   Patient presents with     Physical     non fasting       Initial /82 (BP Location: Right arm, Patient Position: Sitting, Cuff Size: Adult Regular)  Pulse 84  Temp 98.3  F (36.8  C) (Oral)  Resp 18  Ht 1.753 m (5' 9\")  Wt 82.3 kg (181 lb 8 oz)  LMP 02/23/2018  SpO2 98%  BMI 26.8 kg/m2 Estimated body mass index is 26.8 kg/(m^2) as calculated from the following:    Height as of this encounter: 1.753 m (5' 9\").    Weight as of this encounter: 82.3 kg (181 lb 8 oz).  Medication Reconciliation: desiree Kim        "

## 2018-03-13 NOTE — PROGRESS NOTES
SUBJECTIVE:   CC: Ashly Tan is an 48 year old woman who presents for preventive health visit.     Answers for HPI/ROS submitted by the patient on 3/11/2018   Annual Exam:  Getting at least 3 servings of Calcium per day:: Yes  Bi-annual eye exam:: Yes  Dental care twice a year:: Yes  Sleep apnea or symptoms of sleep apnea:: None  Diet:: Regular (no restrictions)  Frequency of exercise:: 2-3 days/week  Taking medications regularly:: Yes  Medication side effects:: None  Additional concerns today:: No  PHQ-2 Score: 0  Duration of exercise:: Greater than 60 minutes          Today's PHQ-2 Score:   PHQ-2 ( 1999 Pfizer) 3/11/2018 3/10/2017   Q1: Little interest or pleasure in doing things 0 -   Q2: Feeling down, depressed or hopeless 0 -   PHQ-2 Score 0 -   Q1: Little interest or pleasure in doing things Not at all Not at all   Q2: Feeling down, depressed or hopeless Not at all Several days   PHQ-2 Score 0 1       Abuse: Current or Past(Physical, Sexual or Emotional)- No  Do you feel safe in your environment - Yes    Social History   Substance Use Topics     Smoking status: Never Smoker     Smokeless tobacco: Never Used     Alcohol use Yes      Comment: 3-4 per week     If you drink alcohol do you typically have >3 drinks per day or >7 drinks per week? No                     Reviewed orders with patient.  Reviewed health maintenance and updated orders accordingly - Yes  Labs reviewed in EPIC  BP Readings from Last 3 Encounters:   03/13/18 126/82   01/12/18 102/80   11/30/17 128/77    Wt Readings from Last 3 Encounters:   03/13/18 82.3 kg (181 lb 8 oz)   01/12/18 83.5 kg (184 lb)   11/30/17 81.9 kg (180 lb 8 oz)                    Patient under age 50, mutual decision reflected in health maintenance.      Pertinent mammograms are reviewed under the imaging tab.  History of abnormal Pap smear: NO - age 30-65 PAP every 5 years with negative HPV co-testing recommended    Reviewed and updated as needed this visit by  "clinical staff  Tobacco  Allergies  Meds  Med Hx  Surg Hx  Fam Hx  Soc Hx        Reviewed and updated as needed this visit by Provider        Here today for routine checkup  Doing well.  Reports no interval health concerns.      ROS:  C: NEGATIVE for fever, chills, change in weight  I: NEGATIVE for worrisome rashes, moles or lesions  E: NEGATIVE for vision changes or irritation  ENT: NEGATIVE for ear, mouth and throat problems  R: NEGATIVE for significant cough or SOB  B: NEGATIVE for masses, tenderness or discharge  CV: NEGATIVE for chest pain, palpitations or peripheral edema  GI: NEGATIVE for nausea, abdominal pain, heartburn, or change in bowel habits  : NEGATIVE for unusual urinary or vaginal symptoms. Periods are regular.  M: NEGATIVE for significant arthralgias or myalgia  N: NEGATIVE for weakness, dizziness or paresthesias  P: NEGATIVE for changes in mood or affect    OBJECTIVE:   /82 (BP Location: Right arm, Patient Position: Sitting, Cuff Size: Adult Regular)  Pulse 84  Temp 98.3  F (36.8  C) (Oral)  Resp 18  Ht 1.753 m (5' 9\")  Wt 82.3 kg (181 lb 8 oz)  LMP 02/23/2018  SpO2 98%  BMI 26.8 kg/m2  EXAM:  GENERAL: healthy, alert and no distress  EYES: Eyes grossly normal to inspection, PERRL and conjunctivae and sclerae normal  HENT: ear canals and TM's normal, nose and mouth without ulcers or lesions  NECK: no adenopathy, no asymmetry, masses, or scars and thyroid normal to palpation  RESP: lungs clear to auscultation - no rales, rhonchi or wheezes  CV: regular rate and rhythm, normal S1 S2, no S3 or S4, no murmur, click or rub, no peripheral edema and peripheral pulses strong  ABDOMEN: soft, nontender, no hepatosplenomegaly, no masses and bowel sounds normal  MS: no gross musculoskeletal defects noted, no edema  SKIN: no suspicious lesions or rashes  NEURO: Normal strength and tone, mentation intact and speech normal  PSYCH: mentation appears normal, affect " "normal/bright    ASSESSMENT/PLAN:   1. Routine general medical examination at a health care facility  Health care maintenance up to date otherwise   - **Glucose FUTURE anytime; Future  - Lipid panel reflex to direct LDL Fasting; Future    2. Major depression in complete remission (H)    - citalopram (CELEXA) 40 MG tablet; Take 1 tablet (40 mg) by mouth daily  Dispense: 90 tablet; Refill: 3    3. Recurrent cystitis    - cephALEXin (KEFLEX) 500 MG capsule; Take 1 capsule (500 mg) by mouth 3 times daily  Dispense: 30 capsule; Refill: 5    COUNSELING:   Reviewed preventive health counseling, as reflected in patient instructions       Regular exercise       Healthy diet/nutrition       Vision screening         reports that she has never smoked. She has never used smokeless tobacco.    Estimated body mass index is 26.8 kg/(m^2) as calculated from the following:    Height as of this encounter: 1.753 m (5' 9\").    Weight as of this encounter: 82.3 kg (181 lb 8 oz).       Counseling Resources:  ATP IV Guidelines  Pooled Cohorts Equation Calculator  Breast Cancer Risk Calculator  FRAX Risk Assessment  ICSI Preventive Guidelines  Dietary Guidelines for Americans, 2010  USDA's MyPlate  ASA Prophylaxis  Lung CA Screening    Amber Ahn MD  [facility]  "

## 2018-05-23 DIAGNOSIS — Z00.00 ROUTINE GENERAL MEDICAL EXAMINATION AT A HEALTH CARE FACILITY: ICD-10-CM

## 2018-05-23 LAB
CHOLEST SERPL-MCNC: 187 MG/DL
GLUCOSE SERPL-MCNC: 82 MG/DL (ref 70–99)
HDLC SERPL-MCNC: 58 MG/DL
LDLC SERPL CALC-MCNC: 110 MG/DL
NONHDLC SERPL-MCNC: 129 MG/DL
TRIGL SERPL-MCNC: 96 MG/DL

## 2018-05-23 PROCEDURE — 80061 LIPID PANEL: CPT | Performed by: FAMILY MEDICINE

## 2018-05-23 PROCEDURE — 82947 ASSAY GLUCOSE BLOOD QUANT: CPT | Performed by: FAMILY MEDICINE

## 2018-05-23 PROCEDURE — 36415 COLL VENOUS BLD VENIPUNCTURE: CPT | Performed by: FAMILY MEDICINE

## 2019-03-13 ENCOUNTER — ANCILLARY PROCEDURE (OUTPATIENT)
Dept: MAMMOGRAPHY | Facility: CLINIC | Age: 49
End: 2019-03-13
Attending: FAMILY MEDICINE
Payer: COMMERCIAL

## 2019-03-13 DIAGNOSIS — Z12.31 VISIT FOR SCREENING MAMMOGRAM: ICD-10-CM

## 2019-03-13 PROCEDURE — 77063 BREAST TOMOSYNTHESIS BI: CPT | Performed by: RADIOLOGY

## 2019-03-13 PROCEDURE — 77067 SCR MAMMO BI INCL CAD: CPT | Performed by: RADIOLOGY

## 2019-03-15 ASSESSMENT — ENCOUNTER SYMPTOMS
DIARRHEA: 0
CONSTIPATION: 0
EYE PAIN: 0
MYALGIAS: 0
HEARTBURN: 0
HEMATURIA: 0
NERVOUS/ANXIOUS: 0
WEAKNESS: 0
SHORTNESS OF BREATH: 0
HEADACHES: 0
PALPITATIONS: 0
ARTHRALGIAS: 0
JOINT SWELLING: 0
PARESTHESIAS: 0
COUGH: 0
BREAST MASS: 0
CHILLS: 0
DIZZINESS: 0
FEVER: 0
SORE THROAT: 0
NAUSEA: 0
HEMATOCHEZIA: 0
FREQUENCY: 0
ABDOMINAL PAIN: 0
DYSURIA: 0

## 2019-03-18 ENCOUNTER — OFFICE VISIT (OUTPATIENT)
Dept: FAMILY MEDICINE | Facility: CLINIC | Age: 49
End: 2019-03-18
Payer: COMMERCIAL

## 2019-03-18 VITALS
HEIGHT: 69 IN | OXYGEN SATURATION: 98 % | WEIGHT: 178 LBS | HEART RATE: 65 BPM | DIASTOLIC BLOOD PRESSURE: 82 MMHG | SYSTOLIC BLOOD PRESSURE: 120 MMHG | BODY MASS INDEX: 26.36 KG/M2 | TEMPERATURE: 97.8 F

## 2019-03-18 DIAGNOSIS — F32.5 MAJOR DEPRESSION IN COMPLETE REMISSION (H): ICD-10-CM

## 2019-03-18 DIAGNOSIS — N30.90 RECURRENT CYSTITIS: ICD-10-CM

## 2019-03-18 DIAGNOSIS — Z00.00 ROUTINE GENERAL MEDICAL EXAMINATION AT A HEALTH CARE FACILITY: Primary | ICD-10-CM

## 2019-03-18 LAB
CHOLEST SERPL-MCNC: 172 MG/DL
GLUCOSE SERPL-MCNC: 87 MG/DL (ref 70–99)
HDLC SERPL-MCNC: 57 MG/DL
LDLC SERPL CALC-MCNC: 100 MG/DL
NONHDLC SERPL-MCNC: 115 MG/DL
TRIGL SERPL-MCNC: 74 MG/DL

## 2019-03-18 PROCEDURE — 80061 LIPID PANEL: CPT | Performed by: FAMILY MEDICINE

## 2019-03-18 PROCEDURE — 36415 COLL VENOUS BLD VENIPUNCTURE: CPT | Performed by: FAMILY MEDICINE

## 2019-03-18 PROCEDURE — 82947 ASSAY GLUCOSE BLOOD QUANT: CPT | Performed by: FAMILY MEDICINE

## 2019-03-18 PROCEDURE — 99396 PREV VISIT EST AGE 40-64: CPT | Performed by: FAMILY MEDICINE

## 2019-03-18 RX ORDER — CEPHALEXIN 500 MG/1
500 CAPSULE ORAL 3 TIMES DAILY
Qty: 30 CAPSULE | Refills: 5 | Status: SHIPPED | OUTPATIENT
Start: 2019-03-18 | End: 2020-03-17

## 2019-03-18 RX ORDER — CITALOPRAM HYDROBROMIDE 40 MG/1
40 TABLET ORAL DAILY
Qty: 90 TABLET | Refills: 3 | Status: SHIPPED | OUTPATIENT
Start: 2019-03-18 | End: 2020-01-31

## 2019-03-18 ASSESSMENT — ENCOUNTER SYMPTOMS
SORE THROAT: 0
HEMATURIA: 0
BREAST MASS: 0
NERVOUS/ANXIOUS: 0
FEVER: 0
HEADACHES: 0
NAUSEA: 0
ABDOMINAL PAIN: 0
DYSURIA: 0
CHILLS: 0
EYE PAIN: 0
FREQUENCY: 0
WEAKNESS: 0
PARESTHESIAS: 0
PALPITATIONS: 0
JOINT SWELLING: 0
COUGH: 0
SHORTNESS OF BREATH: 0
DIZZINESS: 0
MYALGIAS: 0
HEARTBURN: 0
ARTHRALGIAS: 0
HEMATOCHEZIA: 0
DIARRHEA: 0
CONSTIPATION: 0

## 2019-03-18 ASSESSMENT — ANXIETY QUESTIONNAIRES
6. BECOMING EASILY ANNOYED OR IRRITABLE: NOT AT ALL
3. WORRYING TOO MUCH ABOUT DIFFERENT THINGS: NOT AT ALL
GAD7 TOTAL SCORE: 0
2. NOT BEING ABLE TO STOP OR CONTROL WORRYING: NOT AT ALL
5. BEING SO RESTLESS THAT IT IS HARD TO SIT STILL: NOT AT ALL
1. FEELING NERVOUS, ANXIOUS, OR ON EDGE: NOT AT ALL
7. FEELING AFRAID AS IF SOMETHING AWFUL MIGHT HAPPEN: NOT AT ALL

## 2019-03-18 ASSESSMENT — PATIENT HEALTH QUESTIONNAIRE - PHQ9
5. POOR APPETITE OR OVEREATING: NOT AT ALL
SUM OF ALL RESPONSES TO PHQ QUESTIONS 1-9: 0

## 2019-03-18 ASSESSMENT — MIFFLIN-ST. JEOR: SCORE: 1492.03

## 2019-03-18 NOTE — PATIENT INSTRUCTIONS
The 10-year ASCVD risk score (Gregory RAMOS Jr., et al., 2013) is: 0.8%    Values used to calculate the score:      Age: 49 years      Sex: Female      Is Non- : No      Diabetic: No      Tobacco smoker: No      Systolic Blood Pressure: 120 mmHg      Is BP treated: No      HDL Cholesterol: 58 mg/dL      Total Cholesterol: 187 mg/dL      Preventive Health Recommendations  Female Ages 40 to 49    Yearly exam:     See your health care provider every year in order to  1. Review health changes.   2. Discuss preventive care.    3. Review your medicines if your doctor prescribed any.      Get a Pap test every three years (unless you have an abnormal result and your provider advises testing more often).      If you get Pap tests with HPV test, you only need to test every 5 years, unless you have an abnormal result. You do not need a Pap test if your uterus was removed (hysterectomy) and you have not had cancer.      You should be tested each year for STDs (sexually transmitted diseases), if you're at risk.     Ask your doctor if you should have a mammogram.      Have a colonoscopy (test for colon cancer) if someone in your family has had colon cancer or polyps before age 50.       Have a cholesterol test every 5 years.       Have a diabetes test (fasting glucose) after age 45. If you are at risk for diabetes, you should have this test every 3 years.    Shots: Get a flu shot each year. Get a tetanus shot every 10 years.     Nutrition:     Eat at least 5 servings of fruits and vegetables each day.    Eat whole-grain bread, whole-wheat pasta and brown rice instead of white grains and rice.    Get adequate Calcium and Vitamin D.      Lifestyle    Exercise at least 150 minutes a week (an average of 30 minutes a day, 5 days a week). This will help you control your weight and prevent disease.    Limit alcohol to one drink per day.    No smoking.     Wear sunscreen to prevent skin cancer.    See your dentist every  six months for an exam and cleaning.

## 2019-03-18 NOTE — PROGRESS NOTES
SUBJECTIVE:   CC: Ashly Tan is an 49 year old woman who presents for preventive health visit.     Physical   Annual:     Getting at least 3 servings of Calcium per day:  Yes    Bi-annual eye exam:  Yes    Dental care twice a year:  Yes    Sleep apnea or symptoms of sleep apnea:  None    Diet:  Regular (no restrictions)    Frequency of exercise:  2-3 days/week    Duration of exercise:  15-30 minutes    Taking medications regularly:  Yes    Medication side effects:  Not applicable    Additional concerns today:  No    PHQ-2 Total Score: 0              Today's PHQ-2 Score:   PHQ-2 ( 1999 Pfizer) 3/15/2019   Q1: Little interest or pleasure in doing things 0   Q2: Feeling down, depressed or hopeless 0   PHQ-2 Score 0   Q1: Little interest or pleasure in doing things Not at all   Q2: Feeling down, depressed or hopeless Not at all   PHQ-2 Score 0       Abuse: Current or Past(Physical, Sexual or Emotional)- No  Do you feel safe in your environment? Yes    Social History     Tobacco Use     Smoking status: Never Smoker     Smokeless tobacco: Never Used   Substance Use Topics     Alcohol use: Yes     Comment: 3-4 per week     Alcohol Use 3/15/2019   If you drink alcohol do you typically have greater than 3 drinks per day OR greater than 7 drinks per week? No   No flowsheet data found.    Reviewed orders with patient.  Reviewed health maintenance and updated orders accordingly - Yes  Labs reviewed in EPIC  BP Readings from Last 3 Encounters:   03/18/19 120/82   03/13/18 126/82   01/12/18 102/80    Wt Readings from Last 3 Encounters:   03/18/19 80.7 kg (178 lb)   03/13/18 82.3 kg (181 lb 8 oz)   01/12/18 83.5 kg (184 lb)                    Mammogram Screening: Patient under age 50, mutual decision reflected in health maintenance.      Pertinent mammograms are reviewed under the imaging tab.  History of abnormal Pap smear: NO - age 30-65 PAP every 5 years with negative HPV co-testing recommended  PAP / HPV Latest Ref  "Rng & Units 3/13/2017 3/4/2014 1/24/2011   PAP - NIL NIL NIL   HPV 16 DNA NEG Negative - -   HPV 18 DNA NEG Negative - -   OTHER HR HPV NEG Negative - -     Reviewed and updated as needed this visit by clinical staff  Tobacco  Allergies  Meds         Reviewed and updated as needed this visit by Provider        Here today for routine checkup and follow-up on depression  Doing well.  Reports no interval health concerns.    Patient reports no side effects from medications, and desires no change in therapy.     Review of Systems   Constitutional: Negative for chills and fever.   HENT: Negative for congestion, ear pain, hearing loss and sore throat.    Eyes: Negative for pain and visual disturbance.   Respiratory: Negative for cough and shortness of breath.    Cardiovascular: Negative for chest pain, palpitations and peripheral edema.   Gastrointestinal: Negative for abdominal pain, constipation, diarrhea, heartburn, hematochezia and nausea.   Breasts:  Negative for tenderness, breast mass and discharge.   Genitourinary: Negative for dysuria, frequency, genital sores, hematuria, pelvic pain, urgency, vaginal bleeding and vaginal discharge.   Musculoskeletal: Negative for arthralgias, joint swelling and myalgias.   Skin: Negative for rash.   Neurological: Negative for dizziness, weakness, headaches and paresthesias.   Psychiatric/Behavioral: Negative for mood changes. The patient is not nervous/anxious.           OBJECTIVE:   /82 (BP Location: Right arm, Patient Position: Chair, Cuff Size: Adult Regular)   Pulse 65   Temp 97.8  F (36.6  C) (Oral)   Ht 1.745 m (5' 8.7\")   Wt 80.7 kg (178 lb)   LMP 03/09/2019 (Exact Date)   SpO2 98%   Breastfeeding? No   BMI 26.52 kg/m    Physical Exam  GENERAL: healthy, alert and no distress  EYES: Eyes grossly normal to inspection, PERRL and conjunctivae and sclerae normal  HENT: ear canals and TM's normal, nose and mouth without ulcers or lesions  NECK: no adenopathy, no " "asymmetry, masses, or scars and thyroid normal to palpation  RESP: lungs clear to auscultation - no rales, rhonchi or wheezes  CV: regular rate and rhythm, normal S1 S2, no S3 or S4, no murmur, click or rub, no peripheral edema and peripheral pulses strong  ABDOMEN: soft, nontender, no hepatosplenomegaly, no masses and bowel sounds normal  MS: no gross musculoskeletal defects noted, no edema  SKIN: no suspicious lesions or rashes  NEURO: Normal strength and tone, mentation intact and speech normal  PSYCH: mentation appears normal, affect normal/bright    Diagnostic Test Results:  none     ASSESSMENT/PLAN:   1. Routine general medical examination at a health care facility  Routine health maintenance up-to-date  - Glucose  - Lipid panel reflex to direct LDL Fasting    2. Major depression in complete remission (H)  Doing well on current dosage.  Continue same  - citalopram (CELEXA) 40 MG tablet; Take 1 tablet (40 mg) by mouth daily  Dispense: 90 tablet; Refill: 3    3. Recurrent cystitis    - cephALEXin (KEFLEX) 500 MG capsule; Take 1 capsule (500 mg) by mouth 3 times daily  Dispense: 30 capsule; Refill: 5    COUNSELING:  Reviewed preventive health counseling, as reflected in patient instructions       Regular exercise       Healthy diet/nutrition       Vision screening       Hearing screening    BP Readings from Last 1 Encounters:   03/18/19 120/82     Estimated body mass index is 26.52 kg/m  as calculated from the following:    Height as of this encounter: 1.745 m (5' 8.7\").    Weight as of this encounter: 80.7 kg (178 lb).           reports that  has never smoked. she has never used smokeless tobacco.      Counseling Resources:  ATP IV Guidelines  Pooled Cohorts Equation Calculator  Breast Cancer Risk Calculator  FRAX Risk Assessment  ICSI Preventive Guidelines  Dietary Guidelines for Americans, 2010  USDA's MyPlate  ASA Prophylaxis  Lung CA Screening    Amber Ahn MD  [facility]  "

## 2019-03-18 NOTE — RESULT ENCOUNTER NOTE
"Absolutely perfect.  Even that LDL (\"bad\" cholesterol) - my goal for you is < 130  And your high HDL (\"good\" cholesterol) protects the heart.  SHAHEED Ahn M.D."

## 2019-03-19 ASSESSMENT — ANXIETY QUESTIONNAIRES: GAD7 TOTAL SCORE: 0

## 2019-09-30 ENCOUNTER — HEALTH MAINTENANCE LETTER (OUTPATIENT)
Age: 49
End: 2019-09-30

## 2019-10-28 ENCOUNTER — HEALTH MAINTENANCE LETTER (OUTPATIENT)
Age: 49
End: 2019-10-28

## 2020-01-31 ENCOUNTER — MYC REFILL (OUTPATIENT)
Dept: FAMILY MEDICINE | Facility: CLINIC | Age: 50
End: 2020-01-31

## 2020-01-31 DIAGNOSIS — F32.5 MAJOR DEPRESSION IN COMPLETE REMISSION (H): ICD-10-CM

## 2020-01-31 NOTE — TELEPHONE ENCOUNTER
"Requested Prescriptions   Pending Prescriptions Disp Refills     citalopram (CELEXA) 40 MG tablet  Last Written Prescription Date:  3/18/19  Last Fill Quantity: 90 tablet,  # refills: 3   Last office visit: 3/18/2019 with prescribing provider:  ,Dr. Ahn   Future Office Visit:     90 tablet 3     Sig: Take 1 tablet (40 mg) by mouth daily       SSRIs Protocol Failed - 1/31/2020  9:07 AM        Failed - PHQ-9 score less than 5 in past 6 months     Please review last PHQ-9 score.   PHQ-9 SCORE 3/7/2016 7/7/2017 3/18/2019   PHQ-9 Total Score - - -   PHQ-9 Total Score 1 0 0     SHAINA-7 SCORE 3/7/2016 7/7/2017 3/18/2019   Total Score - - -   Total Score 0 0 0                 Failed - Recent (6 mo) or future (30 days) visit within the authorizing provider's specialty     Patient had office visit in the last 6 months or has a visit in the next 30 days with authorizing provider or within the authorizing provider's specialty.  See \"Patient Info\" tab in inbasket, or \"Choose Columns\" in Meds & Orders section of the refill encounter.            Passed - Medication is active on med list        Passed - Patient is age 18 or older        Passed - No active pregnancy on record        Passed - No positive pregnancy test in last 12 months          "

## 2020-02-04 RX ORDER — CITALOPRAM HYDROBROMIDE 40 MG/1
40 TABLET ORAL DAILY
Qty: 90 TABLET | Refills: 0 | Status: SHIPPED | OUTPATIENT
Start: 2020-02-04 | End: 2020-03-17

## 2020-02-04 NOTE — TELEPHONE ENCOUNTER
Routing refill request to provider for review/approval because:  Drug interaction warning  Asia Pepper RN  [facility]

## 2020-03-16 ENCOUNTER — ANCILLARY PROCEDURE (OUTPATIENT)
Dept: MAMMOGRAPHY | Facility: CLINIC | Age: 50
End: 2020-03-16
Attending: FAMILY MEDICINE
Payer: COMMERCIAL

## 2020-03-16 DIAGNOSIS — Z12.31 VISIT FOR SCREENING MAMMOGRAM: ICD-10-CM

## 2020-03-16 PROCEDURE — 77063 BREAST TOMOSYNTHESIS BI: CPT | Performed by: RADIOLOGY

## 2020-03-16 PROCEDURE — 77067 SCR MAMMO BI INCL CAD: CPT | Performed by: RADIOLOGY

## 2020-03-17 ENCOUNTER — MYC REFILL (OUTPATIENT)
Dept: FAMILY MEDICINE | Facility: CLINIC | Age: 50
End: 2020-03-17

## 2020-03-17 DIAGNOSIS — F32.5 MAJOR DEPRESSION IN COMPLETE REMISSION (H): ICD-10-CM

## 2020-03-17 DIAGNOSIS — N30.90 RECURRENT CYSTITIS: ICD-10-CM

## 2020-03-18 NOTE — TELEPHONE ENCOUNTER
"Requested Prescriptions   Pending Prescriptions Disp Refills     cephALEXin (KEFLEX) 500 MG capsule 30 capsule 5     Sig: Take 1 capsule (500 mg) by mouth 3 times daily       There is no refill protocol information for this order        citalopram (CELEXA) 40 MG tablet 90 tablet 0     Sig: Take 1 tablet (40 mg) by mouth daily       SSRIs Protocol Failed - 3/17/2020  6:54 PM        Failed - PHQ-9 score less than 5 in past 6 months     Please review last PHQ-9 score.           Failed - Recent (6 mo) or future (30 days) visit within the authorizing provider's specialty     Patient had office visit in the last 6 months or has a visit in the next 30 days with authorizing provider or within the authorizing provider's specialty.  See \"Patient Info\" tab in inbasket, or \"Choose Columns\" in Meds & Orders section of the refill encounter.            Passed - Medication is active on med list        Passed - Patient is age 18 or older        Passed - No active pregnancy on record        Passed - No positive pregnancy test in last 12 months         cephALEXin (KEFLEX) 500 MG capsule  Last Written Prescription Date:  3/18/19  Last Fill Quantity: 30,  # refills: 5   Last office visit: 3/18/2019 with prescribing provider:  Dr. Ahn   Future Office Visit:        citalopram (CELEXA) 40 MG tablet  Last Written Prescription Date:  2/4/2020  Last Fill Quantity: 90,  # refills: 0   Last office visit: 3/18/2019 with prescribing provider:  Dr. Ahn   Future Office Visit:      PHQ-9 score:    PHQ 3/18/2019   PHQ-9 Total Score 0   Q9: Thoughts of better off dead/self-harm past 2 weeks Not at all             SHAINA-7 SCORE 3/7/2016 7/7/2017 3/18/2019   Total Score - - -   Total Score 0 0 0         "

## 2020-03-19 NOTE — TELEPHONE ENCOUNTER
Routing refill request to provider for review/approval because:  Lisa given x1 and patient did not follow up, please advise  Drug not on the [facility] refill protocol     Debby Kelsey RN  [facility]

## 2020-03-20 RX ORDER — CEPHALEXIN 500 MG/1
500 CAPSULE ORAL 3 TIMES DAILY
Qty: 30 CAPSULE | Refills: 5 | Status: SHIPPED | OUTPATIENT
Start: 2020-03-20 | End: 2020-07-20

## 2020-03-20 RX ORDER — CITALOPRAM HYDROBROMIDE 40 MG/1
40 TABLET ORAL DAILY
Qty: 90 TABLET | Refills: 0 | Status: SHIPPED | OUTPATIENT
Start: 2020-03-20 | End: 2020-07-20

## 2020-07-20 ENCOUNTER — OFFICE VISIT (OUTPATIENT)
Dept: FAMILY MEDICINE | Facility: CLINIC | Age: 50
End: 2020-07-20
Payer: COMMERCIAL

## 2020-07-20 VITALS
WEIGHT: 183 LBS | TEMPERATURE: 97.8 F | SYSTOLIC BLOOD PRESSURE: 136 MMHG | HEART RATE: 69 BPM | RESPIRATION RATE: 16 BRPM | BODY MASS INDEX: 27.11 KG/M2 | DIASTOLIC BLOOD PRESSURE: 85 MMHG | HEIGHT: 69 IN | OXYGEN SATURATION: 98 %

## 2020-07-20 DIAGNOSIS — N30.90 RECURRENT CYSTITIS: ICD-10-CM

## 2020-07-20 DIAGNOSIS — F32.5 MAJOR DEPRESSION IN COMPLETE REMISSION (H): ICD-10-CM

## 2020-07-20 DIAGNOSIS — Z00.00 ROUTINE GENERAL MEDICAL EXAMINATION AT A HEALTH CARE FACILITY: Primary | ICD-10-CM

## 2020-07-20 DIAGNOSIS — Z12.11 COLON CANCER SCREENING: ICD-10-CM

## 2020-07-20 PROCEDURE — 90715 TDAP VACCINE 7 YRS/> IM: CPT | Performed by: FAMILY MEDICINE

## 2020-07-20 PROCEDURE — 99396 PREV VISIT EST AGE 40-64: CPT | Mod: 25 | Performed by: FAMILY MEDICINE

## 2020-07-20 PROCEDURE — 90471 IMMUNIZATION ADMIN: CPT | Performed by: FAMILY MEDICINE

## 2020-07-20 PROCEDURE — 82274 ASSAY TEST FOR BLOOD FECAL: CPT | Performed by: FAMILY MEDICINE

## 2020-07-20 RX ORDER — CEPHALEXIN 500 MG/1
500 CAPSULE ORAL 3 TIMES DAILY
Qty: 30 CAPSULE | Refills: 5 | Status: SHIPPED | OUTPATIENT
Start: 2020-07-20 | End: 2021-08-03

## 2020-07-20 RX ORDER — CITALOPRAM HYDROBROMIDE 40 MG/1
40 TABLET ORAL DAILY
Qty: 90 TABLET | Refills: 3 | Status: SHIPPED | OUTPATIENT
Start: 2020-07-20 | End: 2021-08-02

## 2020-07-20 ASSESSMENT — PAIN SCALES - GENERAL: PAINLEVEL: NO PAIN (0)

## 2020-07-20 ASSESSMENT — PATIENT HEALTH QUESTIONNAIRE - PHQ9: SUM OF ALL RESPONSES TO PHQ QUESTIONS 1-9: 3

## 2020-07-20 ASSESSMENT — MIFFLIN-ST. JEOR: SCORE: 1511.04

## 2020-07-20 NOTE — PROGRESS NOTES
SUBJECTIVE:   CC: Ashly Tan is an 50 year old woman who presents for preventive health visit.     Healthy Habits:    Do you get at least three servings of calcium containing foods daily (dairy, green leafy vegetables, etc.)? yes    Amount of exercise or daily activities, outside of work: 3 day(s) per week    Problems taking medications regularly No    Medication side effects: No    Have you had an eye exam in the past two years? yes    Do you see a dentist twice per year? yes    Do you have sleep apnea, excessive snoring or daytime drowsiness ?yes daytime drowsiness          Today's PHQ-2 Score:   PHQ-2 ( 1999 [employer]) 3/15/2020 3/15/2019   Q1: Little interest or pleasure in doing things 0 0   Q2: Feeling down, depressed or hopeless 0 0   PHQ-2 Score 0 0   Q1: Little interest or pleasure in doing things Not at all Not at all   Q2: Feeling down, depressed or hopeless Not at all Not at all   PHQ-2 Score 0 0       Abuse: Current or Past(Physical, Sexual or Emotional)- No  Do you feel safe in your environment? Yes        Social History     Tobacco Use     Smoking status: Never Smoker     Smokeless tobacco: Never Used   Substance Use Topics     Alcohol use: Yes     Comment: 3-4 per week     If you drink alcohol do you typically have >3 drinks per day or >7 drinks per week? No                     Reviewed orders with patient.  Reviewed health maintenance and updated orders accordingly - Yes  Lab work is in process  Labs reviewed in EPIC  BP Readings from Last 3 Encounters:   07/20/20 136/85   03/18/19 120/82   03/13/18 126/82    Wt Readings from Last 3 Encounters:   07/20/20 83 kg (183 lb)   03/18/19 80.7 kg (178 lb)   03/13/18 82.3 kg (181 lb 8 oz)                    Mammogram Screening: Patient over age 50, mutual decision to screen reflected in health maintenance.    Pertinent mammograms are reviewed under the imaging tab.  History of abnormal Pap smear: NO - age 30-65 PAP every 5 years with negative HPV  "co-testing recommended  PAP / HPV Latest Ref Rng & Units 3/13/2017 3/4/2014 1/24/2011   PAP - NIL NIL NIL   HPV 16 DNA NEG Negative - -   HPV 18 DNA NEG Negative - -   OTHER HR HPV NEG Negative - -     Reviewed and updated as needed this visit by clinical staff  Tobacco  Allergies  Meds  Med Hx  Surg Hx  Fam Hx  Soc Hx        Reviewed and updated as needed this visit by Provider        Here today for routine checkup  Doing well.  Reports no interval health concerns.      ROS:  CONSTITUTIONAL: NEGATIVE for fever, chills, change in weight  INTEGUMENTARU/SKIN: NEGATIVE for worrisome rashes, moles or lesions  EYES: NEGATIVE for vision changes or irritation  ENT: NEGATIVE for ear, mouth and throat problems  RESP: NEGATIVE for significant cough or SOB  BREAST: NEGATIVE for masses, tenderness or discharge  CV: NEGATIVE for chest pain, palpitations or peripheral edema  GI: NEGATIVE for nausea, abdominal pain, heartburn, or change in bowel habits  : NEGATIVE for unusual urinary or vaginal symptoms. Periods are regular.  MUSCULOSKELETAL: NEGATIVE for significant arthralgias or myalgia  NEURO: NEGATIVE for weakness, dizziness or paresthesias  PSYCHIATRIC: NEGATIVE for changes in mood or affect    OBJECTIVE:   /85 (BP Location: Left arm, Patient Position: Chair, Cuff Size: Adult Regular)   Pulse 69   Temp 97.8  F (36.6  C) (Tympanic)   Resp 16   Ht 1.747 m (5' 8.79\")   Wt 83 kg (183 lb)   LMP 06/28/2020   SpO2 98%   Breastfeeding No   BMI 27.19 kg/m    EXAM:  GENERAL: healthy, alert and no distress  EYES: Eyes grossly normal to inspection, PERRL and conjunctivae and sclerae normal  HENT: ear canals and TM's normal, nose and mouth without ulcers or lesions  NECK: no adenopathy, no asymmetry, masses, or scars and thyroid normal to palpation  RESP: lungs clear to auscultation - no rales, rhonchi or wheezes  BREAST: normal without masses, tenderness or nipple discharge and no palpable axillary masses or " "adenopathy  CV: regular rate and rhythm, normal S1 S2, no S3 or S4, no murmur, click or rub, no peripheral edema and peripheral pulses strong  ABDOMEN: soft, nontender, no hepatosplenomegaly, no masses and bowel sounds normal  MS: no gross musculoskeletal defects noted, no edema  SKIN: no suspicious lesions or rashes  NEURO: Normal strength and tone, mentation intact and speech normal  PSYCH: mentation appears normal, affect normal/bright    Diagnostic Test Results:  Labs reviewed in Epic    ASSESSMENT/PLAN:   1. Routine general medical examination at a health care facility  Routine health maintenance up-to-date otherwise.  Tetanus booster today.  Strongly encourage flu shot in the fall  - Lipid panel reflex to direct LDL Fasting  - Glucose    2. Major depression in complete remission (H)  Doing well on current regimen.  Continue same  - citalopram (CELEXA) 40 MG tablet; Take 1 tablet (40 mg) by mouth daily  Dispense: 90 tablet; Refill: 3    3. Recurrent cystitis    - cephALEXin (KEFLEX) 500 MG capsule; Take 1 capsule (500 mg) by mouth 3 times daily  Dispense: 30 capsule; Refill: 5    4. Colon cancer screening  Patient would like to defer full colonoscopy until post COVID  - Fecal colorectal cancer screen (FIT); Future    COUNSELING:   Reviewed preventive health counseling, as reflected in patient instructions       Regular exercise       Healthy diet/nutrition       Vision screening       Hearing screening    Estimated body mass index is 27.19 kg/m  as calculated from the following:    Height as of this encounter: 1.747 m (5' 8.79\").    Weight as of this encounter: 83 kg (183 lb).         reports that she has never smoked. She has never used smokeless tobacco.      Counseling Resources:  ATP IV Guidelines  Pooled Cohorts Equation Calculator  Breast Cancer Risk Calculator  FRAX Risk Assessment  ICSI Preventive Guidelines  Dietary Guidelines for Americans, 2010  USDA's MyPlate  ASA Prophylaxis  Lung CA " Screening    Amber Ahn MD  [facility]

## 2020-07-21 DIAGNOSIS — Z00.00 ROUTINE GENERAL MEDICAL EXAMINATION AT A HEALTH CARE FACILITY: ICD-10-CM

## 2020-07-21 LAB
CHOLEST SERPL-MCNC: 174 MG/DL
GLUCOSE SERPL-MCNC: 86 MG/DL (ref 70–99)
HDLC SERPL-MCNC: 59 MG/DL
LDLC SERPL CALC-MCNC: 91 MG/DL
NONHDLC SERPL-MCNC: 115 MG/DL
TRIGL SERPL-MCNC: 121 MG/DL

## 2020-07-21 PROCEDURE — 82947 ASSAY GLUCOSE BLOOD QUANT: CPT | Performed by: FAMILY MEDICINE

## 2020-07-21 PROCEDURE — 36415 COLL VENOUS BLD VENIPUNCTURE: CPT | Performed by: FAMILY MEDICINE

## 2020-07-21 PROCEDURE — 80061 LIPID PANEL: CPT | Performed by: FAMILY MEDICINE

## 2020-07-24 LAB — HEMOCCULT STL QL IA: NEGATIVE

## 2020-07-24 NOTE — RESULT ENCOUNTER NOTE
Brittany,  Your stool test was normal - no evidence of blood.  This is a screening test for colon cancer - and though this is not as thorough as a full colonoscopy it has been found to be accurate in detecting concerning colon lesions.  We should repeat this annually.   SHAHEED Ahn M.D.

## 2020-08-06 ENCOUNTER — OFFICE VISIT (OUTPATIENT)
Dept: URGENT CARE | Facility: URGENT CARE | Age: 50
End: 2020-08-06
Payer: COMMERCIAL

## 2020-08-06 VITALS
WEIGHT: 182 LBS | DIASTOLIC BLOOD PRESSURE: 92 MMHG | BODY MASS INDEX: 27.04 KG/M2 | HEART RATE: 92 BPM | TEMPERATURE: 98.8 F | OXYGEN SATURATION: 96 % | RESPIRATION RATE: 16 BRPM | SYSTOLIC BLOOD PRESSURE: 153 MMHG

## 2020-08-06 DIAGNOSIS — R10.33 PERIUMBILICAL ABDOMINAL PAIN: ICD-10-CM

## 2020-08-06 DIAGNOSIS — R10.31 ABDOMINAL PAIN, RIGHT LOWER QUADRANT: Primary | ICD-10-CM

## 2020-08-06 PROCEDURE — 99214 OFFICE O/P EST MOD 30 MIN: CPT | Performed by: PHYSICIAN ASSISTANT

## 2020-08-06 ASSESSMENT — ENCOUNTER SYMPTOMS
FEVER: 0
DYSURIA: 0
SHORTNESS OF BREATH: 0
CONSTIPATION: 0
CARDIOVASCULAR NEGATIVE: 1
CHILLS: 0
VOMITING: 0
HEARTBURN: 0
FLANK PAIN: 0
COUGH: 0
HEMATOCHEZIA: 0
FREQUENCY: 0
RESPIRATORY NEGATIVE: 1
HEMATURIA: 0
CHEST TIGHTNESS: 0
NAUSEA: 0
ABDOMINAL PAIN: 1
FATIGUE: 0
ANAL BLEEDING: 0
WHEEZING: 0
PALPITATIONS: 0
DIARRHEA: 0

## 2020-08-06 ASSESSMENT — PAIN SCALES - GENERAL: PAINLEVEL: SEVERE PAIN (7)

## 2020-08-07 NOTE — PROGRESS NOTES
Subjective   Ashly Tan is a 50 year old female who presents to clinic today for the following health issues:  HPI   Abdominal Pain    Duration: 2days    Description (location/character/radiation): periumbilical, RLQ, sharp, cramping, no radiation        Associated flank pain: None    Intensity:  moderate, severe, 7/10    Accompanying signs and symptoms:        Fever/Chills: no        Gas/Bloating: no        Nausea/vomitting: no        Diarrhea: No constipation, diarrhea, bloody or black tarry stools.  No fever, chills or sweats       Dysuria or Hematuria: No dysuria, urinary frequency, urgency or hematuria.  No vaginal d/c, bleeding, rashes and irritation.  No new partners.  Last BM was today.    History (previous similar pain/trauma/previous testing):  Had similar episode months ago which resolved on its own    Precipitating or alleviating factors:       Pain worse with eating/BM/urination: Yes       Pain relieved by BM: YES- mildly    Therapies tried and outcome: peptobismul with minimal relief    LMP:  1week ago    Patient Active Problem List   Diagnosis     Allergic rhinitis     CARDIOVASCULAR SCREENING; LDL GOAL LESS THAN 160     Major depression in complete remission (H)     Past Surgical History:   Procedure Laterality Date     NO HISTORY OF SURGERY         Social History     Tobacco Use     Smoking status: Never Smoker     Smokeless tobacco: Never Used   Substance Use Topics     Alcohol use: Yes     Comment: 3-4 per week     Family History   Problem Relation Age of Onset     Diabetes Paternal Grandfather      Asthma Sister      Circulatory Maternal Grandfather         stroke     Cancer Maternal Aunt         Ovarian         Current Outpatient Medications   Medication Sig Dispense Refill     CALCIUM PO        cephALEXin (KEFLEX) 500 MG capsule Take 1 capsule (500 mg) by mouth 3 times daily 30 capsule 5     citalopram (CELEXA) 40 MG tablet Take 1 tablet (40 mg) by mouth daily 90 tablet 3     Ferrous  Sulfate (IRON SUPPLEMENT PO)        MAGNESIUM PO        ADVIL COLD/SINUS PO prn       ALPRAZolam (XANAX) 0.5 MG tablet Take 1 tablet (0.5 mg) by mouth 3 times daily as needed for anxiety 60 tablet 0     Cyanocobalamin (VITAMIN B 12 PO)        vitamin B complex with vitamin C (VITAMIN  B COMPLEX) TABS tablet Take 1 tablet by mouth daily       No Known Allergies    Reviewed and updated as needed this visit by Provider       Review of Systems   Constitutional: Negative for chills, fatigue and fever.   Respiratory: Negative.  Negative for cough, chest tightness, shortness of breath and wheezing.    Cardiovascular: Negative.  Negative for chest pain, palpitations and peripheral edema.   Gastrointestinal: Positive for abdominal pain. Negative for anal bleeding, constipation, diarrhea, heartburn, hematochezia, nausea and vomiting.   Genitourinary: Negative for dysuria, flank pain, frequency, hematuria, pelvic pain, urgency, vaginal bleeding and vaginal discharge.   All other systems reviewed and are negative.           Objective    BP (!) 153/92   Pulse 92   Temp 98.8  F (37.1  C) (Tympanic)   Resp 16   Wt 82.6 kg (182 lb)   SpO2 96%   BMI 27.04 kg/m    Body mass index is 27.04 kg/m .  Physical Exam  Vitals signs and nursing note reviewed.   Constitutional:       General: She is not in acute distress.     Appearance: Normal appearance. She is well-developed and normal weight. She is not ill-appearing.   Cardiovascular:      Rate and Rhythm: Normal rate and regular rhythm.      Pulses: Normal pulses.      Heart sounds: Normal heart sounds, S1 normal and S2 normal. No murmur. No friction rub. No gallop.    Pulmonary:      Effort: Pulmonary effort is normal. No accessory muscle usage or respiratory distress.      Breath sounds: Normal breath sounds and air entry. No decreased breath sounds, wheezing, rhonchi or rales.   Abdominal:      General: Abdomen is flat. Bowel sounds are normal.      Palpations: Abdomen is soft.  There is no hepatomegaly, splenomegaly or mass.      Tenderness: There is abdominal tenderness in the right lower quadrant and periumbilical area. There is guarding and rebound. There is no right CVA tenderness or left CVA tenderness. Positive signs include McBurney's sign. Negative signs include Rodriguez's sign, Rovsing's sign, psoas sign and obturator sign.      Hernia: No hernia is present.   Genitourinary:     Comments: Declined pelvic exam.  Skin:     General: Skin is warm and dry.   Neurological:      Mental Status: She is alert and oriented to person, place, and time.   Psychiatric:         Mood and Affect: Mood normal.         Behavior: Behavior normal.         Thought Content: Thought content normal.         Judgment: Judgment normal.             Assessment & Plan   Abdominal pain, right lower quadrant:  Along with periumbilical pain.  H&P is concerning for acute appendicitis vs kidney stones/infection vs GYN cause vs musculoskeletal.  Due to the severity of her pain, recommend further evaluation and management in the ER.  Will most likely need further workup with labs and/or imaging.  Patient has declined transportation via ambulance and will have family drive her.  Understands risks and benefits of ambulance transfer and she has declined.  Call 911 if worsening symptoms.  She plans to go to [place] [facility].  She left in stable condition with AVS in hand.  F/u with PCP after ER visit.     Periumbilical abdominal pain           Kika See ZHEN Pop  [facility]

## 2020-08-20 ENCOUNTER — OFFICE VISIT (OUTPATIENT)
Dept: SURGERY | Facility: CLINIC | Age: 50
End: 2020-08-20
Payer: COMMERCIAL

## 2020-08-20 VITALS
SYSTOLIC BLOOD PRESSURE: 113 MMHG | WEIGHT: 182 LBS | HEART RATE: 78 BPM | BODY MASS INDEX: 27.58 KG/M2 | DIASTOLIC BLOOD PRESSURE: 77 MMHG | HEIGHT: 68 IN

## 2020-08-20 DIAGNOSIS — Z90.49 S/P APPENDECTOMY: Primary | ICD-10-CM

## 2020-08-20 PROCEDURE — 99024 POSTOP FOLLOW-UP VISIT: CPT | Performed by: SURGERY

## 2020-08-20 ASSESSMENT — MIFFLIN-ST. JEOR: SCORE: 1494.05

## 2020-08-20 NOTE — PROGRESS NOTES
"General Surgery Post Op    Pt returns for follow up visit s/p appendectomy.    Patient has been doing well, tolerating diet. Bowels moving well. Pain controlled. No issues with wound healing/redness/drainage. No fevers.    Physical exam: Vitals: /77   Pulse 78   Ht 1.727 m (5' 8\")   Wt 82.6 kg (182 lb)   BMI 27.67 kg/m    BMI= Body mass index is 27.67 kg/m .    Exam:  Constitutional: healthy, alert and no distress  Respiratory: Non-labored  Gastrointestinal: Abdomen soft, non-tender. BS normal. No masses, organomegaly  Incision is healing well with no erythema or exudate    Path:  appendicitis    Assessment: Pt is doing well s/p appendectomy  - We discussed the pathology results and all questions were answered to the best of my ability.     Plan: Pt doing well and can follow up as needed.       Jenaro Camargo, DO      "

## 2020-08-20 NOTE — LETTER
"    8/20/2020         RE: Ashly Tan  [address]  [place] [address]        Dear Colleague,    Thank you for referring your patient, Ashly Tan, to the [facility]. Please see a copy of my visit note below.    General Surgery Post Op    Pt returns for follow up visit s/p appendectomy.    Patient has been doing well, tolerating diet. Bowels moving well. Pain controlled. No issues with wound healing/redness/drainage. No fevers.    Physical exam: Vitals: /77   Pulse 78   Ht 1.727 m (5' 8\")   Wt 82.6 kg (182 lb)   BMI 27.67 kg/m    BMI= Body mass index is 27.67 kg/m .    Exam:  Constitutional: healthy, alert and no distress  Respiratory: Non-labored  Gastrointestinal: Abdomen soft, non-tender. BS normal. No masses, organomegaly  Incision is healing well with no erythema or exudate    Path:  appendicitis    Assessment: Pt is doing well s/p appendectomy  - We discussed the pathology results and all questions were answered to the best of my ability.     Plan: Pt doing well and can follow up as needed.       Jenaro Camargo, DO        Again, thank you for allowing me to participate in the care of your patient.        Sincerely,        Jenaro Camargo, DO    "

## 2020-12-02 ENCOUNTER — MYC MEDICAL ADVICE (OUTPATIENT)
Dept: FAMILY MEDICINE | Facility: CLINIC | Age: 50
End: 2020-12-02

## 2021-01-15 ENCOUNTER — HEALTH MAINTENANCE LETTER (OUTPATIENT)
Age: 51
End: 2021-01-15

## 2021-03-17 DIAGNOSIS — Z12.31 VISIT FOR SCREENING MAMMOGRAM: ICD-10-CM

## 2021-03-17 PROCEDURE — 77063 BREAST TOMOSYNTHESIS BI: CPT | Mod: GC

## 2021-03-17 PROCEDURE — 77067 SCR MAMMO BI INCL CAD: CPT | Mod: GC

## 2021-04-14 ENCOUNTER — OFFICE VISIT (OUTPATIENT)
Dept: NURSING | Facility: CLINIC | Age: 51
End: 2021-04-14
Payer: COMMERCIAL

## 2021-04-14 PROCEDURE — 91300 PR COVID VAC PFIZER DIL RECON 30 MCG/0.3 ML IM: CPT

## 2021-04-14 PROCEDURE — 0001A PR COVID VAC PFIZER DIL RECON 30 MCG/0.3 ML IM: CPT

## 2021-05-05 ENCOUNTER — IMMUNIZATION (OUTPATIENT)
Dept: NURSING | Facility: CLINIC | Age: 51
End: 2021-05-05
Attending: INTERNAL MEDICINE
Payer: COMMERCIAL

## 2021-05-05 PROCEDURE — 91300 PR COVID VAC PFIZER DIL RECON 30 MCG/0.3 ML IM: CPT

## 2021-05-05 PROCEDURE — 0002A PR COVID VAC PFIZER DIL RECON 30 MCG/0.3 ML IM: CPT

## 2021-05-25 ENCOUNTER — OFFICE VISIT (OUTPATIENT)
Dept: URGENT CARE | Facility: URGENT CARE | Age: 51
End: 2021-05-25
Payer: COMMERCIAL

## 2021-05-25 VITALS
OXYGEN SATURATION: 98 % | HEART RATE: 84 BPM | SYSTOLIC BLOOD PRESSURE: 143 MMHG | BODY MASS INDEX: 28.01 KG/M2 | WEIGHT: 184.2 LBS | DIASTOLIC BLOOD PRESSURE: 95 MMHG | RESPIRATION RATE: 16 BRPM

## 2021-05-25 DIAGNOSIS — S81.851A CAT BITE OF LOWER LEG, RIGHT, INITIAL ENCOUNTER: Primary | ICD-10-CM

## 2021-05-25 DIAGNOSIS — T07.XXXA MULTIPLE ABRASIONS: ICD-10-CM
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
## 2021-05-25 PROCEDURE — 99213 OFFICE O/P EST LOW 20 MIN: CPT | Performed by: PHYSICIAN ASSISTANT

## 2021-05-25 RX ORDER — AZITHROMYCIN 250 MG/1
TABLET, FILM COATED ORAL
Qty: 6 TABLET | Refills: 0 | Status: SHIPPED | OUTPATIENT
Start: 2021-05-25 | End: 2021-08-03

## 2021-05-25 ASSESSMENT — PAIN SCALES - GENERAL: PAINLEVEL: MILD PAIN (2)

## 2021-05-25 NOTE — PROGRESS NOTES
Medical Decision Making:    Differential Diagnosis:  Cat bite, cat scratch, cellulitis     Tetanus 7/20/2020     ASSESSMENT:    ICD-10-CM    1. Cat bite of lower leg, right, initial encounter  S81.851A amoxicillin-clavulanate (AUGMENTIN) 875-125 MG tablet    W55.01XA azithromycin (ZITHROMAX Z-GARY) 250 MG tablet   2. Multiple abrasions  T07.XXXA            PLAN: Cat bite and cat scratches.  Will cover for bacteria Pasteurella multosida and Bartonella henselae with Augmentin and azith.  Wash wounds thoroughly with water and Hibiclens.  Keep covered.  Take probiotics or eat Greek yogurt to maintain good gut corrie.  Watch for evidence of infection-redness, drainage, fever, increased pain.  Follow-up as needed.  See today's orders.  Follow-up with primary clinic if not improving.  Advised about symptoms which might herald more serious problems.        Zahra Joseph PA-C         SUBJECTIVE:  51-year-old Female Presents for bite and cat scratches to right lower leg.  Her daughter growled at her  as a joke and the cat attacked her.  She tried to help her daughter in the process was also attacked.  Cat is current on all vaccinations including rabies.           No Known Allergies    Past Medical History:   Diagnosis Date     Allergic rhinitis     vs Mechanical Rhinitis     History of asthma     Occassional     Major depression in complete remission (H) 9/27/2011       CALCIUM PO,   cephALEXin (KEFLEX) 500 MG capsule, Take 1 capsule (500 mg) by mouth 3 times daily  citalopram (CELEXA) 40 MG tablet, Take 1 tablet (40 mg) by mouth daily  Ferrous Sulfate (IRON SUPPLEMENT PO),   MAGNESIUM PO,   vitamin B complex with vitamin C (VITAMIN  B COMPLEX) TABS tablet, Take 1 tablet by mouth daily    No current facility-administered medications on file prior to visit.       Social History     Tobacco Use     Smoking status: Never Smoker     Smokeless tobacco: Never Used   Substance Use Topics     Alcohol use: Yes      Comment: 3-4 per week     Drug use: No       ROS:  General: negative for fever  SKIN: + as above      Physcial Exam:  BP (!) 143/95   Pulse 84   Resp 16   Wt 83.6 kg (184 lb 3.2 oz)   SpO2 98%   BMI 28.01 kg/m        GENERAL: alert, no acute distress  EYES: conjunctival clear  RESP: Regular breathing rate  NEURO: awake .  SKIN: Right lower anterior leg is with a few puncture wounds and multiple scratches.  Full range of motion of knee and ankle joints.  Sensation to soft touch intact.    Zahra Joseph PA-C

## 2021-07-31 DIAGNOSIS — F32.5 MAJOR DEPRESSION IN COMPLETE REMISSION (H): ICD-10-CM

## 2021-08-02 RX ORDER — CITALOPRAM HYDROBROMIDE 40 MG/1
TABLET ORAL
Qty: 90 TABLET | Refills: 3 | Status: SHIPPED | OUTPATIENT
Start: 2021-08-02 | End: 2022-07-27

## 2021-08-02 NOTE — TELEPHONE ENCOUNTER
Routing refill request to provider for review/approval because:  Patient needs updated PHQ-9.    PHQ-9 and GAD7 Scores  10/6/2015   3/7/2016   7/7/2017   3/18/2019   7/20/2020    PHQ-9 Total Score 0 1 0 0 3   SHAINA-7 Total Score 0 0 0 0 -    10/6/2015 3/7/2016 7/7/2017 3/18/2019 7/20/2020   Patient has office visit scheduled 8/3/21, please advise.         Amber Hernández RN  [facility]

## 2021-08-02 NOTE — PROGRESS NOTES
SUBJECTIVE:   CC: Ashly Tan is an 51 year old woman who presents for preventive health visit.       Patient has been advised of split billing requirements and indicates understanding: Yes  Healthy Habits:     Getting at least 3 servings of Calcium per day:  Yes    Bi-annual eye exam:  Yes    Dental care twice a year:  Yes    Sleep apnea or symptoms of sleep apnea:  None    Diet:  Regular (no restrictions)    Frequency of exercise:  2-3 days/week    Duration of exercise:  Greater than 60 minutes    Taking medications regularly:  Yes    Medication side effects:  Not applicable    PHQ-2 Total Score: 0    Additional concerns today:  Yes      Today's PHQ-2 Score:   PHQ-2 ( 1999 Pfizer) 8/3/2021   Q1: Little interest or pleasure in doing things 0   Q2: Feeling down, depressed or hopeless 0   PHQ-2 Score 0   Q1: Little interest or pleasure in doing things Not at all   Q2: Feeling down, depressed or hopeless Not at all   PHQ-2 Score 0       Abuse: Current or Past (Physical, Sexual or Emotional) - No  Do you feel safe in your environment? Yes    Have you ever done Advance Care Planning? (For example, a Health Directive, POLST, or a discussion with a medical provider or your loved ones about your wishes): No, advance care planning information given to patient to review.  Patient declined advance care planning discussion at this time.    Social History     Tobacco Use     Smoking status: Never Smoker     Smokeless tobacco: Never Used   Substance Use Topics     Alcohol use: Yes     Comment: 3-4 per week     If you drink alcohol do you typically have >3 drinks per day or >7 drinks per week? No    Alcohol Use 8/3/2021   Prescreen: >3 drinks/day or >7 drinks/week? No   Prescreen: >3 drinks/day or >7 drinks/week? -   No flowsheet data found.    Reviewed orders with patient.  Reviewed health maintenance and updated orders accordingly - Yes  Lab work is in process  Labs reviewed in EPIC  BP Readings from Last 3 Encounters:    08/03/21 114/60   05/25/21 (!) 143/95   08/20/20 113/77    Wt Readings from Last 3 Encounters:   08/03/21 80.9 kg (178 lb 6.4 oz)   05/25/21 83.6 kg (184 lb 3.2 oz)   08/20/20 82.6 kg (182 lb)                    Breast Cancer Screening:  Any new diagnosis of family breast, ovarian, or bowel cancer? no    FHS-7:   Breast CA Risk Assessment (FHS-7) 8/3/2021   Did any of your first-degree relatives have breast or ovarian cancer? No   Did any of your relatives have bilateral breast cancer? No   Did any man in your family have breast cancer? No   Did any woman in your family have breast and ovarian cancer? No   Did any woman in your family have breast cancer before age 50 y? No   Do you have 2 or more relatives with breast and/or ovarian cancer? No   Do you have 2 or more relatives with breast and/or bowel cancer? No         Pertinent mammograms are reviewed under the imaging tab.    History of abnormal Pap smear: NO - age 30-65 PAP every 5 years with negative HPV co-testing recommended  PAP / HPV Latest Ref Rng & Units 3/13/2017 3/4/2014 1/24/2011   PAP (Historical) - NIL NIL NIL   HPV16 NEG Negative - -   HPV18 NEG Negative - -   HRHPV NEG Negative - -     Reviewed and updated as needed this visit by clinical staff  Tobacco  Allergies  Meds   Med Hx  Surg Hx  Fam Hx  Soc Hx        Reviewed and updated as needed this visit by Provider                Here today for routine checkup  Doing well.  Reports no interval health concerns.      Review of Systems  CONSTITUTIONAL: NEGATIVE for fever, chills, change in weight  INTEGUMENTARU/SKIN: NEGATIVE for worrisome rashes, moles or lesions  EYES: NEGATIVE for vision changes or irritation  ENT: NEGATIVE for ear, mouth and throat problems  RESP: NEGATIVE for significant cough or SOB  BREAST: NEGATIVE for masses, tenderness or discharge  CV: NEGATIVE for chest pain, palpitations or peripheral edema  GI: NEGATIVE for nausea, abdominal pain, heartburn, or change in bowel  "habits  : NEGATIVE for unusual urinary or vaginal symptoms. Periods are regular.  MUSCULOSKELETAL: NEGATIVE for significant arthralgias or myalgia  NEURO: NEGATIVE for weakness, dizziness or paresthesias  PSYCHIATRIC: NEGATIVE for changes in mood or affect     OBJECTIVE:   /60 (BP Location: Right arm, Patient Position: Sitting, Cuff Size: Adult Regular)   Pulse 67   Temp 98.7  F (37.1  C) (Oral)   Resp 16   Ht 1.736 m (5' 8.35\")   Wt 80.9 kg (178 lb 6.4 oz)   LMP 07/16/2021   SpO2 99%   Breastfeeding No   BMI 26.85 kg/m    Physical Exam  GENERAL: healthy, alert and no distress  EYES: Eyes grossly normal to inspection, PERRL and conjunctivae and sclerae normal  HENT: ear canals and TM's normal, nose and mouth without ulcers or lesions  NECK: no adenopathy, no asymmetry, masses, or scars and thyroid normal to palpation  RESP: lungs clear to auscultation - no rales, rhonchi or wheezes  CV: regular rate and rhythm, normal S1 S2, no S3 or S4, no murmur, click or rub, no peripheral edema and peripheral pulses strong  ABDOMEN: soft, nontender, no hepatosplenomegaly, no masses and bowel sounds normal  MS: no gross musculoskeletal defects noted, no edema  SKIN: no suspicious lesions or rashes  NEURO: Normal strength and tone, mentation intact and speech normal  PSYCH: mentation appears normal, affect normal/bright    Diagnostic Test Results:  Labs reviewed in Epic    ASSESSMENT/PLAN:   1. Routine general medical examination at a health care facility  Routine health maintenance up-to-date otherwise    2. Major depression in complete remission (H)  Stable on current regimen.  Continue same plan and routine follow-up.     3. Recurrent cystitis  Stable on current regimen.  Continue same plan and routine follow-up.   - cephALEXin (KEFLEX) 500 MG capsule; Take 1 capsule (500 mg) by mouth 3 times daily  Dispense: 30 capsule; Refill: 5    4. Screening cholesterol level    - Lipid panel reflex to direct LDL Fasting; " "Future    5. Diabetes mellitus screening    - GLUCOSE; Future    6. Colon cancer screening    - Adult Gastro Ref - Procedure Only; Future    Patient has been advised of split billing requirements and indicates understanding: Yes  COUNSELING:  Reviewed preventive health counseling, as reflected in patient instructions       Regular exercise       Healthy diet/nutrition       Vision screening       Colon cancer screening    Estimated body mass index is 26.85 kg/m  as calculated from the following:    Height as of this encounter: 1.736 m (5' 8.35\").    Weight as of this encounter: 80.9 kg (178 lb 6.4 oz).        She reports that she has never smoked. She has never used smokeless tobacco.      Counseling Resources:  ATP IV Guidelines  Pooled Cohorts Equation Calculator  Breast Cancer Risk Calculator  BRCA-Related Cancer Risk Assessment: FHS-7 Tool  FRAX Risk Assessment  ICSI Preventive Guidelines  Dietary Guidelines for Americans, 2010  USDA's MyPlate  ASA Prophylaxis  Lung CA Screening    Amber Ahn MD  [facility]  Answers for HPI/ROS submitted by the patient on 8/3/2021  If you checked off any problems, how difficult have these problems made it for you to do your work, take care of things at home, or get along with other people?: Not difficult at all  PHQ9 TOTAL SCORE: 2  SHAINA 7 TOTAL SCORE: 1      "

## 2021-08-03 ENCOUNTER — OFFICE VISIT (OUTPATIENT)
Dept: FAMILY MEDICINE | Facility: CLINIC | Age: 51
End: 2021-08-03
Payer: COMMERCIAL

## 2021-08-03 VITALS
WEIGHT: 178.4 LBS | RESPIRATION RATE: 16 BRPM | SYSTOLIC BLOOD PRESSURE: 114 MMHG | OXYGEN SATURATION: 99 % | DIASTOLIC BLOOD PRESSURE: 60 MMHG | HEART RATE: 67 BPM | HEIGHT: 68 IN | TEMPERATURE: 98.7 F | BODY MASS INDEX: 27.04 KG/M2

## 2021-08-03 DIAGNOSIS — F32.5 MAJOR DEPRESSION IN COMPLETE REMISSION (H): ICD-10-CM

## 2021-08-03 DIAGNOSIS — Z12.11 COLON CANCER SCREENING: ICD-10-CM

## 2021-08-03 DIAGNOSIS — N30.90 RECURRENT CYSTITIS: ICD-10-CM

## 2021-08-03 DIAGNOSIS — Z00.00 ROUTINE GENERAL MEDICAL EXAMINATION AT A HEALTH CARE FACILITY: Primary | ICD-10-CM

## 2021-08-03 DIAGNOSIS — Z13.220 SCREENING CHOLESTEROL LEVEL: ICD-10-CM

## 2021-08-03 DIAGNOSIS — Z13.1 DIABETES MELLITUS SCREENING: ICD-10-CM

## 2021-08-03 LAB
CHOLEST SERPL-MCNC: 168 MG/DL
FASTING STATUS PATIENT QL REPORTED: YES
FASTING STATUS PATIENT QL REPORTED: YES
GLUCOSE BLD-MCNC: 87 MG/DL (ref 70–99)
HDLC SERPL-MCNC: 52 MG/DL
LDLC SERPL CALC-MCNC: 93 MG/DL
NONHDLC SERPL-MCNC: 116 MG/DL
TRIGL SERPL-MCNC: 117 MG/DL

## 2021-08-03 PROCEDURE — 80061 LIPID PANEL: CPT | Performed by: FAMILY MEDICINE

## 2021-08-03 PROCEDURE — 99396 PREV VISIT EST AGE 40-64: CPT | Performed by: FAMILY MEDICINE

## 2021-08-03 PROCEDURE — 36415 COLL VENOUS BLD VENIPUNCTURE: CPT | Performed by: FAMILY MEDICINE

## 2021-08-03 PROCEDURE — 82947 ASSAY GLUCOSE BLOOD QUANT: CPT | Performed by: FAMILY MEDICINE

## 2021-08-03 RX ORDER — CEPHALEXIN 500 MG/1
500 CAPSULE ORAL 3 TIMES DAILY
Qty: 30 CAPSULE | Refills: 5 | Status: SHIPPED | OUTPATIENT
Start: 2021-08-03 | End: 2022-09-06

## 2021-08-03 ASSESSMENT — ENCOUNTER SYMPTOMS
DYSURIA: 0
ABDOMINAL PAIN: 0
HEARTBURN: 0
DIZZINESS: 1
FREQUENCY: 0
PALPITATIONS: 0
DIARRHEA: 0
JOINT SWELLING: 0
NAUSEA: 0
HEMATURIA: 0
ARTHRALGIAS: 0
SHORTNESS OF BREATH: 0
HEADACHES: 0
EYE PAIN: 0
SORE THROAT: 0
FEVER: 0
WEAKNESS: 0
HEMATOCHEZIA: 0
CHILLS: 0
NERVOUS/ANXIOUS: 1
COUGH: 0
PARESTHESIAS: 0
BREAST MASS: 0
MYALGIAS: 0
CONSTIPATION: 0

## 2021-08-03 ASSESSMENT — ANXIETY QUESTIONNAIRES
GAD7 TOTAL SCORE: 1
GAD7 TOTAL SCORE: 1
2. NOT BEING ABLE TO STOP OR CONTROL WORRYING: NOT AT ALL
7. FEELING AFRAID AS IF SOMETHING AWFUL MIGHT HAPPEN: NOT AT ALL
4. TROUBLE RELAXING: NOT AT ALL
6. BECOMING EASILY ANNOYED OR IRRITABLE: NOT AT ALL
GAD7 TOTAL SCORE: 1
8. IF YOU CHECKED OFF ANY PROBLEMS, HOW DIFFICULT HAVE THESE MADE IT FOR YOU TO DO YOUR WORK, TAKE CARE OF THINGS AT HOME, OR GET ALONG WITH OTHER PEOPLE?: NOT DIFFICULT AT ALL
5. BEING SO RESTLESS THAT IT IS HARD TO SIT STILL: NOT AT ALL
1. FEELING NERVOUS, ANXIOUS, OR ON EDGE: SEVERAL DAYS
7. FEELING AFRAID AS IF SOMETHING AWFUL MIGHT HAPPEN: NOT AT ALL
3. WORRYING TOO MUCH ABOUT DIFFERENT THINGS: NOT AT ALL

## 2021-08-03 ASSESSMENT — PATIENT HEALTH QUESTIONNAIRE - PHQ9
SUM OF ALL RESPONSES TO PHQ QUESTIONS 1-9: 2
10. IF YOU CHECKED OFF ANY PROBLEMS, HOW DIFFICULT HAVE THESE PROBLEMS MADE IT FOR YOU TO DO YOUR WORK, TAKE CARE OF THINGS AT HOME, OR GET ALONG WITH OTHER PEOPLE: NOT DIFFICULT AT ALL
SUM OF ALL RESPONSES TO PHQ QUESTIONS 1-9: 2

## 2021-08-03 ASSESSMENT — MIFFLIN-ST. JEOR: SCORE: 1478.21

## 2021-08-03 NOTE — PROGRESS NOTES
Answers for HPI/ROS submitted by the patient on 8/3/2021  If you checked off any problems, how difficult have these problems made it for you to do your work, take care of things at home, or get along with other people?: Not difficult at all  PHQ9 TOTAL SCORE: 2  SHAINA 7 TOTAL SCORE: 1  Frequency of exercise:: 2-3 days/week  Getting at least 3 servings of Calcium per day:: Yes  Diet:: Regular (no restrictions)  Taking medications regularly:: Yes  Medication side effects:: Not applicable  Bi-annual eye exam:: Yes  Dental care twice a year:: Yes  Sleep apnea or symptoms of sleep apnea:: None  abdominal pain: No  Blood in stool: No  Blood in urine: No  chest pain: No  chills: No  congestion: Yes  constipation: No  cough: No  diarrhea: No  dizziness: Yes  ear pain: No  eye pain: No  nervous/anxious: Yes  fever: No  frequency: No  genital sores: No  headaches: No  hearing loss: No  heartburn: No  arthralgias: No  joint swelling: No  peripheral edema: No  mood changes: No  myalgias: No  nausea: No  dysuria: No  palpitations: No  Skin sensation changes: No  sore throat: No  urgency: No  rash: No  shortness of breath: No  visual disturbance: No  weakness: No  pelvic pain: No  vaginal bleeding: No  vaginal discharge: No  tenderness: No  breast mass: No  breast discharge: No  Additional concerns today:: Yes  Duration of exercise:: Greater than 60 minutes

## 2021-08-04 ASSESSMENT — ANXIETY QUESTIONNAIRES: GAD7 TOTAL SCORE: 1

## 2021-08-16 ENCOUNTER — E-VISIT (OUTPATIENT)
Dept: FAMILY MEDICINE | Facility: CLINIC | Age: 51
End: 2021-08-16
Payer: COMMERCIAL

## 2021-08-16 DIAGNOSIS — I78.1 NEVUS, NON-NEOPLASTIC: Primary | ICD-10-CM

## 2021-08-16 PROCEDURE — 99421 OL DIG E/M SVC 5-10 MIN: CPT | Performed by: FAMILY MEDICINE

## 2021-08-20 ENCOUNTER — TELEPHONE (OUTPATIENT)
Dept: GASTROENTEROLOGY | Facility: CLINIC | Age: 51
End: 2021-08-20

## 2021-08-20 NOTE — TELEPHONE ENCOUNTER
Screening Questions  Are you active on mychart? Yes   1. What insurance is in the chart? [employer]     2.  Ordering/Referring Provider: Amber Ahn MD in [facility]    3. BMI 26.85    4. Are you on daily home oxygen? No     5. Do you have a history of difficult airway? No     6. Have you had a heart, lung, or liver transplant?  No     7. Are you currently on dialysis? No     8. Have you had a stroke or Transient ischemic atttack (TIA) within 6 months?  No     9. In the past 6 months, have you had any heart related issues including cardiomyopathy or heart attack?         If yes, did it require cardiac stenting or other implantable device? No     10. Do you have any implantable devices in your body (pacemaker, defib, LVAD)? No     11. Do you take nitroglycerin? If yes, how often? No     12. Are you currently taking any blood thinners?no     13. Are you a diabetic? No     14. (Females) Are you currently pregnant? No   If yes, how many weeks?    15. Have you had a procedure in the past that was difficult to tolerate with conscious sedation? Any allergies to Fentanyl or Versed  No     16. Are you taking any scheduled prescription narcotics more than once daily? No     17. Do you have any chemical dependencies such as alcohol, street drugs, or methadone? No     18. Do you have any history of post-traumatic stress syndrome or mental health issues? No     19. Do you transfer independently? Yes     20.  Do you have any issues with constipation? No     21. Preferred Pharmacy for Pre Prescription [facility]     Scheduling Details    Which Colonoscopy Prep was Sent?: Miralax- Mychart  Procedure Scheduled: Colonoscopy   Provider/Surgeon: Darrell   Date of Procedure: 9/21/21  Location: [facility] [place]   Caller (Please ask for phone number if not scheduled by patient): patient       Sedation Type: c.sedation   Conscious Sedation- Needs  for 6 hours after the procedure  MAC/General-Needs  for  24 hours after procedure    Pre-op Required at [facility] [place], [place] and OR for MAC sedation:   (if yes advise patient they will need a pre-op prior to procedure)      Is patient on blood thinners? -no  (If yes- inform patient to follow up with PCP or provider for follow up instructions)     Informed patient they will need an adult  yes   Cannot take any type of public or medical transportation alone    Informed Patient of COVID Test Requirement yes-scheduled     Confirmed Nurse will call to complete assessment yes    Additional comments:

## 2021-08-24 DIAGNOSIS — Z11.59 ENCOUNTER FOR SCREENING FOR OTHER VIRAL DISEASES: ICD-10-CM

## 2021-09-16 ASSESSMENT — MIFFLIN-ST. JEOR: SCORE: 1473.17

## 2021-09-17 ENCOUNTER — TELEPHONE (OUTPATIENT)
Dept: GASTROENTEROLOGY | Facility: CLINIC | Age: 51
End: 2021-09-17

## 2021-09-17 ENCOUNTER — LAB (OUTPATIENT)
Dept: URGENT CARE | Facility: URGENT CARE | Age: 51
End: 2021-09-17
Payer: COMMERCIAL

## 2021-09-17 DIAGNOSIS — Z11.59 ENCOUNTER FOR SCREENING FOR OTHER VIRAL DISEASES: ICD-10-CM

## 2021-09-17 PROCEDURE — U0005 INFEC AGEN DETEC AMPLI PROBE: HCPCS

## 2021-09-17 PROCEDURE — 99207 PR NO CHARGE LOS: CPT

## 2021-09-17 PROCEDURE — U0003 INFECTIOUS AGENT DETECTION BY NUCLEIC ACID (DNA OR RNA); SEVERE ACUTE RESPIRATORY SYNDROME CORONAVIRUS 2 (SARS-COV-2) (CORONAVIRUS DISEASE [COVID-19]), AMPLIFIED PROBE TECHNIQUE, MAKING USE OF HIGH THROUGHPUT TECHNOLOGIES AS DESCRIBED BY CMS-2020-01-R: HCPCS

## 2021-09-17 NOTE — TELEPHONE ENCOUNTER
Caller: Called Brittany     Procedure: Colonoscopy     Date of Procedure Cancelled: 09/21/2021    Ordering Provider: Amber Ahn MD    Reason for cancel: Dr. Ramírez out     Any Staff messages sent regarding case?: YES                   Recipient and Sender:  Aurelia Blanc Scheduling Pool; Rachel Koch                  Message Details: Dr Ramírez will be out 9/21/21 in the am.   Please reschedule her am cases      Rescheduled: Yes with Dr. Camargo     If rescheduled:    Date: 09/20/2021   Location: [facility]    Note any change or update to original order/sedation:

## 2021-09-18 LAB — SARS-COV-2 RNA RESP QL NAA+PROBE: NEGATIVE

## 2021-09-20 ENCOUNTER — HOSPITAL ENCOUNTER (OUTPATIENT)
Facility: AMBULATORY SURGERY CENTER | Age: 51
Discharge: HOME | End: 2021-09-20
Attending: SURGERY | Admitting: SURGERY
Payer: COMMERCIAL

## 2021-09-20 VITALS
OXYGEN SATURATION: 96 % | RESPIRATION RATE: 16 BRPM | TEMPERATURE: 96.8 F | WEIGHT: 175 LBS | SYSTOLIC BLOOD PRESSURE: 124 MMHG | DIASTOLIC BLOOD PRESSURE: 90 MMHG | HEIGHT: 69 IN | BODY MASS INDEX: 25.92 KG/M2

## 2021-09-20 LAB — COLONOSCOPY: NORMAL

## 2021-09-20 PROCEDURE — G8918 PT W/O PREOP ORDER IV AB PRO: HCPCS

## 2021-09-20 PROCEDURE — 99152 MOD SED SAME PHYS/QHP 5/>YRS: CPT | Mod: 59 | Performed by: SURGERY

## 2021-09-20 PROCEDURE — G8907 PT DOC NO EVENTS ON DISCHARG: HCPCS

## 2021-09-20 PROCEDURE — 45385 COLONOSCOPY W/LESION REMOVAL: CPT

## 2021-09-20 PROCEDURE — 88305 TISSUE EXAM BY PATHOLOGIST: CPT | Performed by: PATHOLOGY

## 2021-09-20 PROCEDURE — 45385 COLONOSCOPY W/LESION REMOVAL: CPT | Mod: PT | Performed by: SURGERY

## 2021-09-20 RX ORDER — ONDANSETRON 2 MG/ML
4 INJECTION INTRAMUSCULAR; INTRAVENOUS
Status: DISCONTINUED | OUTPATIENT
Start: 2021-09-20 | End: 2021-09-21 | Stop reason: HOSPADM

## 2021-09-20 RX ORDER — IBUPROFEN 200 MG
200 TABLET ORAL EVERY 4 HOURS PRN
COMMUNITY

## 2021-09-20 RX ORDER — FENTANYL CITRATE 50 UG/ML
INJECTION, SOLUTION INTRAMUSCULAR; INTRAVENOUS PRN
Status: DISCONTINUED | OUTPATIENT
Start: 2021-09-20 | End: 2021-09-20 | Stop reason: HOSPADM

## 2021-09-20 RX ORDER — LIDOCAINE 40 MG/G
CREAM TOPICAL
Status: DISCONTINUED | OUTPATIENT
Start: 2021-09-20 | End: 2021-09-21 | Stop reason: HOSPADM

## 2021-09-22 LAB
PATH REPORT.COMMENTS IMP SPEC: NORMAL
PATH REPORT.COMMENTS IMP SPEC: NORMAL
PATH REPORT.FINAL DX SPEC: NORMAL
PATH REPORT.GROSS SPEC: NORMAL
PATH REPORT.MICROSCOPIC SPEC OTHER STN: NORMAL
PATH REPORT.RELEVANT HX SPEC: NORMAL
PHOTO IMAGE: NORMAL

## 2021-10-24 ENCOUNTER — HEALTH MAINTENANCE LETTER (OUTPATIENT)
Age: 51
End: 2021-10-24

## 2022-04-11 ENCOUNTER — ANCILLARY PROCEDURE (OUTPATIENT)
Dept: MAMMOGRAPHY | Facility: CLINIC | Age: 52
End: 2022-04-11
Attending: FAMILY MEDICINE
Payer: COMMERCIAL

## 2022-04-11 DIAGNOSIS — Z12.31 VISIT FOR SCREENING MAMMOGRAM: ICD-10-CM

## 2022-04-11 PROCEDURE — 77067 SCR MAMMO BI INCL CAD: CPT

## 2022-04-11 PROCEDURE — 77063 BREAST TOMOSYNTHESIS BI: CPT

## 2022-07-27 DIAGNOSIS — F32.5 MAJOR DEPRESSION IN COMPLETE REMISSION (H): ICD-10-CM

## 2022-07-27 RX ORDER — CITALOPRAM HYDROBROMIDE 40 MG/1
TABLET ORAL
Qty: 90 TABLET | Refills: 0 | Status: SHIPPED | OUTPATIENT
Start: 2022-07-27 | End: 2022-09-06

## 2022-08-30 ASSESSMENT — ENCOUNTER SYMPTOMS
NERVOUS/ANXIOUS: 0
PALPITATIONS: 0
CONSTIPATION: 0
SORE THROAT: 0
HEMATOCHEZIA: 0
FREQUENCY: 0
ABDOMINAL PAIN: 0
DIZZINESS: 0
EYE PAIN: 0
HEADACHES: 0
JOINT SWELLING: 0
PARESTHESIAS: 0
FEVER: 0
HEARTBURN: 0
COUGH: 0
ARTHRALGIAS: 0
HEMATURIA: 0
DYSURIA: 0
CHILLS: 0
BREAST MASS: 0
NAUSEA: 0
DIARRHEA: 0
SHORTNESS OF BREATH: 0
WEAKNESS: 0
MYALGIAS: 0

## 2022-09-06 ENCOUNTER — OFFICE VISIT (OUTPATIENT)
Dept: FAMILY MEDICINE | Facility: CLINIC | Age: 52
End: 2022-09-06
Payer: COMMERCIAL

## 2022-09-06 VITALS
HEART RATE: 77 BPM | SYSTOLIC BLOOD PRESSURE: 124 MMHG | DIASTOLIC BLOOD PRESSURE: 76 MMHG | BODY MASS INDEX: 25.92 KG/M2 | TEMPERATURE: 98.3 F | WEIGHT: 175 LBS | HEIGHT: 69 IN | OXYGEN SATURATION: 98 % | RESPIRATION RATE: 16 BRPM

## 2022-09-06 DIAGNOSIS — Z13.220 SCREENING CHOLESTEROL LEVEL: ICD-10-CM

## 2022-09-06 DIAGNOSIS — N30.90 RECURRENT CYSTITIS: ICD-10-CM

## 2022-09-06 DIAGNOSIS — Z00.00 ROUTINE GENERAL MEDICAL EXAMINATION AT A HEALTH CARE FACILITY: Primary | ICD-10-CM

## 2022-09-06 DIAGNOSIS — C44.719 BASAL CELL CARCINOMA (BCC) OF SKIN OF LEFT LOWER EXTREMITY INCLUDING HIP: ICD-10-CM

## 2022-09-06 DIAGNOSIS — Z12.4 CERVICAL CANCER SCREENING: ICD-10-CM

## 2022-09-06 DIAGNOSIS — Z13.1 DIABETES MELLITUS SCREENING: ICD-10-CM

## 2022-09-06 DIAGNOSIS — F32.5 MAJOR DEPRESSION IN COMPLETE REMISSION (H): ICD-10-CM

## 2022-09-06 LAB
CHOLEST SERPL-MCNC: 182 MG/DL
FASTING STATUS PATIENT QL REPORTED: YES
FASTING STATUS PATIENT QL REPORTED: YES
GLUCOSE BLD-MCNC: 93 MG/DL (ref 70–99)
HDLC SERPL-MCNC: 56 MG/DL
LDLC SERPL CALC-MCNC: 110 MG/DL
NONHDLC SERPL-MCNC: 126 MG/DL
TRIGL SERPL-MCNC: 81 MG/DL

## 2022-09-06 PROCEDURE — 99396 PREV VISIT EST AGE 40-64: CPT | Mod: 25 | Performed by: FAMILY MEDICINE

## 2022-09-06 PROCEDURE — 88305 TISSUE EXAM BY PATHOLOGIST: CPT | Performed by: FAMILY MEDICINE

## 2022-09-06 PROCEDURE — G0145 SCR C/V CYTO,THINLAYER,RESCR: HCPCS | Performed by: FAMILY MEDICINE

## 2022-09-06 PROCEDURE — 80061 LIPID PANEL: CPT | Performed by: FAMILY MEDICINE

## 2022-09-06 PROCEDURE — 36415 COLL VENOUS BLD VENIPUNCTURE: CPT | Performed by: FAMILY MEDICINE

## 2022-09-06 PROCEDURE — 87624 HPV HI-RISK TYP POOLED RSLT: CPT | Performed by: FAMILY MEDICINE

## 2022-09-06 PROCEDURE — 11601 EXC TR-EXT MAL+MARG 0.6-1 CM: CPT | Performed by: FAMILY MEDICINE

## 2022-09-06 PROCEDURE — 82947 ASSAY GLUCOSE BLOOD QUANT: CPT | Performed by: FAMILY MEDICINE

## 2022-09-06 RX ORDER — CEPHALEXIN 500 MG/1
500 CAPSULE ORAL 3 TIMES DAILY
Qty: 30 CAPSULE | Refills: 5 | Status: SHIPPED | OUTPATIENT
Start: 2022-09-06 | End: 2023-09-12

## 2022-09-06 RX ORDER — CITALOPRAM HYDROBROMIDE 40 MG/1
40 TABLET ORAL DAILY
Qty: 90 TABLET | Refills: 3 | Status: SHIPPED | OUTPATIENT
Start: 2022-09-06 | End: 2023-09-12

## 2022-09-06 ASSESSMENT — ENCOUNTER SYMPTOMS
PARESTHESIAS: 0
WEAKNESS: 0
PALPITATIONS: 0
DYSURIA: 0
HEADACHES: 0
HEARTBURN: 0
SHORTNESS OF BREATH: 0
MYALGIAS: 0
DIZZINESS: 0
HEMATURIA: 0
COUGH: 0
HEMATOCHEZIA: 0
ARTHRALGIAS: 0
CHILLS: 0
SORE THROAT: 0
FEVER: 0
NERVOUS/ANXIOUS: 0
CONSTIPATION: 0
JOINT SWELLING: 0
EYE PAIN: 0
FREQUENCY: 0
ABDOMINAL PAIN: 0
DIARRHEA: 0
NAUSEA: 0
BREAST MASS: 0

## 2022-09-06 ASSESSMENT — PATIENT HEALTH QUESTIONNAIRE - PHQ9
SUM OF ALL RESPONSES TO PHQ QUESTIONS 1-9: 0
SUM OF ALL RESPONSES TO PHQ QUESTIONS 1-9: 0
10. IF YOU CHECKED OFF ANY PROBLEMS, HOW DIFFICULT HAVE THESE PROBLEMS MADE IT FOR YOU TO DO YOUR WORK, TAKE CARE OF THINGS AT HOME, OR GET ALONG WITH OTHER PEOPLE: NOT DIFFICULT AT ALL

## 2022-09-06 ASSESSMENT — PAIN SCALES - GENERAL: PAINLEVEL: NO PAIN (0)
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
## 2022-09-08 PROBLEM — C44.719 BASAL CELL CARCINOMA (BCC) OF SKIN OF LEFT LOWER EXTREMITY INCLUDING HIP: Status: ACTIVE | Noted: 2022-09-08

## 2022-09-08 NOTE — RESULT ENCOUNTER NOTE
Hi, Brittany,  Well I am really glad we decided to shave that spot.  It looks like it actually is a basal cell skin cancer.  These are locally growing skin cancers with no risk of systemic issues.  But we want to make sure it is gone.  According to the biopsy it did extend to the edges so I think we should get you in with a dermatologist so that they can remove a wider area and make sure it is all gone.  Plus a good chance to get a head to toe look for anything else that may be of concern.  I will make a referral and have the schedulers contact you.  SHAHEED Ahn M.D.

## 2022-09-09 LAB
BKR LAB AP GYN ADEQUACY: NORMAL
BKR LAB AP GYN INTERPRETATION: NORMAL
BKR LAB AP HPV REFLEX: NORMAL
BKR LAB AP LMP: NORMAL
BKR LAB AP PREVIOUS ABNORMAL: NORMAL
PATH REPORT.COMMENTS IMP SPEC: NORMAL
PATH REPORT.COMMENTS IMP SPEC: NORMAL
PATH REPORT.RELEVANT HX SPEC: NORMAL

## 2022-09-12 ENCOUNTER — TELEPHONE (OUTPATIENT)
Dept: DERMATOLOGY | Facility: CLINIC | Age: 52
End: 2022-09-12

## 2022-09-12 LAB
HUMAN PAPILLOMA VIRUS 16 DNA: NEGATIVE
HUMAN PAPILLOMA VIRUS 18 DNA: NEGATIVE
HUMAN PAPILLOMA VIRUS FINAL DIAGNOSIS: NORMAL
HUMAN PAPILLOMA VIRUS OTHER HR: NEGATIVE

## 2022-09-12 NOTE — TELEPHONE ENCOUNTER
Left patient a voicemail to schedule mohs for left thigh BCC with Dr. Kennedy. Provided my direct phone number.    Patient can have a consult if she would like but it is not required    Karen Mckeon on 9/12/2022 at 2:22 PM

## 2022-09-12 NOTE — TELEPHONE ENCOUNTER
Called patient to schedule surgery with Dr. Kennedy    Date of Surgery: 10/25    Surgery type: mohs    Consult scheduled: No    Has patient had mohs before? No    Additional comments: patient opted out of having a consult - sent her mohs quick iglesia Mckeon on 9/12/2022 at 3:17 PM

## 2022-09-13 NOTE — TELEPHONE ENCOUNTER
FUTURE VISIT INFORMATION      FUTURE VISIT INFORMATION:    Date: 10.25.22    Time: 8:30    Location: [facility]  REFERRAL INFORMATION:    Referring provider:  Anabelle    Referring providers clinic:  Family Medicine    Reason for visit/diagnosis  left thigh BCC    RECORDS REQUESTED FROM:       Clinic name Comments Records Status Imaging Status   Family Med 9.6.22  Path # [id number] Epic na

## 2022-10-15 ENCOUNTER — HEALTH MAINTENANCE LETTER (OUTPATIENT)
Age: 52
End: 2022-10-15

## 2022-10-25 ENCOUNTER — PRE VISIT (OUTPATIENT)
Dept: DERMATOLOGY | Facility: CLINIC | Age: 52
End: 2022-10-25

## 2022-10-25 ENCOUNTER — OFFICE VISIT (OUTPATIENT)
Dept: DERMATOLOGY | Facility: CLINIC | Age: 52
End: 2022-10-25
Payer: COMMERCIAL

## 2022-10-25 VITALS — DIASTOLIC BLOOD PRESSURE: 84 MMHG | SYSTOLIC BLOOD PRESSURE: 128 MMHG | HEART RATE: 88 BPM

## 2022-10-25 DIAGNOSIS — C44.719 BASAL CELL CARCINOMA (BCC) OF LEFT THIGH: Primary | ICD-10-CM

## 2022-10-25 DIAGNOSIS — C44.719 BASAL CELL CARCINOMA (BCC) OF SKIN OF LEFT LOWER EXTREMITY INCLUDING HIP: ICD-10-CM

## 2022-10-25 PROCEDURE — 12032 INTMD RPR S/A/T/EXT 2.6-7.5: CPT | Mod: GC | Performed by: DERMATOLOGY

## 2022-10-25 PROCEDURE — 88305 TISSUE EXAM BY PATHOLOGIST: CPT | Mod: 26 | Performed by: DERMATOLOGY

## 2022-10-25 PROCEDURE — 11602 EXC TR-EXT MAL+MARG 1.1-2 CM: CPT | Mod: GC | Performed by: DERMATOLOGY

## 2022-10-25 PROCEDURE — 88305 TISSUE EXAM BY PATHOLOGIST: CPT | Mod: TC | Performed by: DERMATOLOGY

## 2022-10-25 ASSESSMENT — PAIN SCALES - GENERAL: PAINLEVEL: NO PAIN (0)

## 2022-10-25 NOTE — LETTER
10/25/2022       RE: Ashly Tan  [address]  [place] [address]     Dear Colleague,    Thank you for referring your patient, Ashly Tan, to the [facility] DERMATOLOGIC SURGERY [facility] [place] at [facility]. Please see a copy of my visit note below.    [facility] Dermatologic Surgery Excision Note    Dermatology Problem List  NMSC  - L thigh, BCC, s/p excision 10/25/22  __________________________________    Case #: 1  Date of Service:  Oct 25, 2022  Surgery: Wide local excision  Staff Surgeon: Reginaldo Kennedy DO  Fellow Surgeon: Jaiden Jj MD  Resident Surgeon: Paolo Alvarez MD  Nurse: Lynn Leblanc CMA    Tumor Type: BCC  Location: Left thigh  Derm-Path Accession #: [id number]    Skin Lesion Size:  0.7 cm x 0.7 cm  Margin: 4 mm  Excision size: 1.5 cm x 1.5 cm  Level of Defect: Fat  Repair Type: Intermediate linear  Repair Size: 4.5 cm  Suture Material: 4-0 Monocryl    INDICATIONS:  The patient was scheduled for wide local excision of the skin lesion documented above. We discussed the principles of treatment and most likely complications including scarring, bleeding, infection, incomplete excision, wound dehiscence, pain, nerve damage, and recurrence. Informed consent was obtained and the patient underwent the procedure as follows:    PROCEDURE:  With the patient in a supine position, the lesion was outlined with the margin documented above.  The lesion and the necessary margin (excised diameter) was measured and documented as above.  An ellipse was designed around the lesion to conform to relaxed skin tension lines in an effort to minimize scarring and deformity.  The lesion and surrounding skin were prepped with chlorhexidine, draped, and anesthetized with lidocaine with epinephrine. Using a #15 blade, the skin was excised along premarked lines.  The level of the defect is documented as above.  Wound margins were  undermined to limit functional deformity/impairment of adjacent structures. Bleeding vessels were controlled with electrocoagulation.  The dermis and subcutaneous tissue were closed with buried vertical mattress sutures using 4-0 Monocryl.  Epidermal approximation was meticulously refined with 4-0 Monocryl subcuticular sutures, resulting in a linear closure with little to no wound tension.  Blood loss was estimated to be less than 5 mL.  The area was coated with petrolatum and covered with a non-adherent dressing followed by gauze and tape. Postoperative instructions were reviewed per protocol.  The patient left alert and fully oriented.    Follow-up for suture removal: Not applicable as only dissolving sutures used    Reginaldo Kennedy DO was immediately available for the entire surgery and was physicially present for the key portions of the procedure.    Jaiden Jj MD  Micrographic Surgery and Dermatologic Oncology (MSDO) Fellow    Scribe Disclosure:  I, Jonathan Huitron, am serving as a scribe to document services personally performed by Reginaldo Kennedy DO based on data collection and the provider's statements to me.     Staff Physician Comments:   I saw and evaluated the patient with the Mohs Surgery Fellow (Dr. Jaiden Jj) and I agree with the assessment and plan and the above description of the procedure as documented by the scribe. I was present for the key portions of the procedure and entire exam. I was immediately available in the clinic throughout the procedures.       Reginaldo Kennedy DO    Department of Dermatology  [facility]: Phone: [phone], Fax:[phone]  [facility]: Phone: [phone], Fax: [phone]

## 2022-10-25 NOTE — PATIENT INSTRUCTIONS

## 2022-10-25 NOTE — NURSING NOTE
Chief Complaint   Patient presents with     Derm Problem     Patient is here today for mohs on left thigh.      Lynn ARNOLD CMA

## 2022-10-25 NOTE — PROGRESS NOTES
[facility] Dermatologic Surgery Excision Note    Dermatology Problem List  NMSC  - L thigh, BCC, s/p excision 10/25/22  __________________________________    Case #: 1  Date of Service:  Oct 25, 2022  Surgery: Wide local excision  Staff Surgeon: Reginaldo Kennedy DO  Fellow Surgeon: Jaiden Jj MD  Resident Surgeon: Paolo Alvarez MD  Nurse: Lynn Leblanc CMA    Tumor Type: BCC  Location: Left thigh  Derm-Path Accession #: [id number]    Skin Lesion Size:  0.7 cm x 0.7 cm  Margin: 4 mm  Excision size: 1.5 cm x 1.5 cm  Level of Defect: Fat  Repair Type: Intermediate linear  Repair Size: 4.5 cm  Suture Material: 4-0 Monocryl    INDICATIONS:  The patient was scheduled for wide local excision of the skin lesion documented above. We discussed the principles of treatment and most likely complications including scarring, bleeding, infection, incomplete excision, wound dehiscence, pain, nerve damage, and recurrence. Informed consent was obtained and the patient underwent the procedure as follows:    PROCEDURE:  With the patient in a supine position, the lesion was outlined with the margin documented above.  The lesion and the necessary margin (excised diameter) was measured and documented as above.  An ellipse was designed around the lesion to conform to relaxed skin tension lines in an effort to minimize scarring and deformity.  The lesion and surrounding skin were prepped with chlorhexidine, draped, and anesthetized with lidocaine with epinephrine. Using a #15 blade, the skin was excised along premarked lines.  The level of the defect is documented as above.  Wound margins were undermined to limit functional deformity/impairment of adjacent structures. Bleeding vessels were controlled with electrocoagulation.  The dermis and subcutaneous tissue were closed with buried vertical mattress sutures using 4-0 Monocryl.  Epidermal approximation was meticulously refined with 4-0 Monocryl subcuticular sutures,  resulting in a linear closure with little to no wound tension.  Blood loss was estimated to be less than 5 mL.  The area was coated with petrolatum and covered with a non-adherent dressing followed by gauze and tape. Postoperative instructions were reviewed per protocol.  The patient left alert and fully oriented.    Follow-up for suture removal: Not applicable as only dissolving sutures used    Reginaldo Kennedy DO was immediately available for the entire surgery and was physicially present for the key portions of the procedure.    Jaiden Jj MD  Micrographic Surgery and Dermatologic Oncology (MSDO) Fellow    Scribe Disclosure:  I, Jonathan Huitron, am serving as a scribe to document services personally performed by Reginaldo Kennedy DO based on data collection and the provider's statements to me.     Staff Physician Comments:   I saw and evaluated the patient with the Mohs Surgery Fellow (Dr. Jaiden Jj) and I agree with the assessment and plan and the above description of the procedure as documented by the scribe. I was present for the key portions of the procedure and entire exam. I was immediately available in the clinic throughout the procedures.       Reginaldo Kennedy DO    Department of Dermatology  [facility]: Phone: [phone], Fax:[phone]  [facility]: Phone: [phone], Fax: [phone]

## 2022-10-27 LAB
PATH REPORT.COMMENTS IMP SPEC: NORMAL
PATH REPORT.COMMENTS IMP SPEC: NORMAL
PATH REPORT.FINAL DX SPEC: NORMAL
PATH REPORT.GROSS SPEC: NORMAL
PATH REPORT.MICROSCOPIC SPEC OTHER STN: NORMAL
PATH REPORT.RELEVANT HX SPEC: NORMAL

## 2022-12-06 NOTE — TELEPHONE ENCOUNTER
SPINE PATIENTS - NEW PROTOCOL PREVISIT    RECORDS RECEIVED FROM: Internal   REASON FOR VISIT: Midline low back pain without sciatica, unspecified chronicity [M54.50]   Date of Appt: 12/29/2022   NOTES (FOR ALL VISITS) STATUS DETAILS   OFFICE NOTE from referring provider Internal 09/06/2022 Dr Ahn [facility]    OFFICE NOTE from other specialist N/A    DISCHARGE SUMMARY from hospital N/A    DISCHARGE REPORT from ER N/A    EMG REPORT N/A    MEDICATION LIST N/A    IMAGING  (FOR ALL VISITS)     MRI (HEAD, NECK, SPINE) N/A    XRAY (SPINE) *NEUROSURGERY* N/A    CT (HEAD, NECK, SPINE) N/A

## 2022-12-15 ENCOUNTER — OFFICE VISIT (OUTPATIENT)
Dept: DERMATOLOGY | Facility: CLINIC | Age: 52
End: 2022-12-15
Payer: COMMERCIAL

## 2022-12-15 DIAGNOSIS — Z85.828 HISTORY OF BASAL CELL CARCINOMA: Primary | ICD-10-CM

## 2022-12-15 DIAGNOSIS — L82.1 SEBORRHEIC KERATOSIS: ICD-10-CM

## 2022-12-15 PROCEDURE — 99213 OFFICE O/P EST LOW 20 MIN: CPT | Mod: GC | Performed by: STUDENT IN AN ORGANIZED HEALTH CARE EDUCATION/TRAINING PROGRAM

## 2022-12-15 ASSESSMENT — PAIN SCALES - GENERAL: PAINLEVEL: NO PAIN (0)

## 2022-12-15 NOTE — NURSING NOTE
Dermatology Rooming Note    Ashly Tan's goals for this visit include:   Chief Complaint   Patient presents with     Skin Check     FBSE.  Spots of concern on the back.     Maile Mayorga, CMA

## 2022-12-15 NOTE — PATIENT INSTRUCTIONS
Checking for Skin Cancer  You can find cancer early by checking your skin each month. There are 3 kinds of skin cancer. They are melanoma, basal cell carcinoma, and squamous cell carcinoma. Doing monthly skin checks is the best way to find new marks or skin changes. Follow the instructions below for checking your skin.   The ABCDEs of checking moles for melanoma   Check your moles or growths for signs of melanoma using ABCDE:   Asymmetry: the sides of the mole or growth don t match  Border: the edges are ragged, notched, or blurred  Color: the color within the mole or growth varies  Diameter: the mole or growth is larger than 6 mm (size of a pencil eraser)  Evolving: the size, shape, or color of the mole or growth is changing (evolving is not shown in the images below)    Checking for other types of skin cancer  Basal cell carcinoma or squamous cell carcinoma have symptoms such as:     A spot or mole that looks different from all other marks on your skin  Changes in how an area feels, such as itching, tenderness, or pain  Changes in the skin's surface, such as oozing, bleeding, or scaliness  A sore that does not heal  New swelling or redness beyond the border of a mole    Who s at risk?  Anyone can get skin cancer. But you are at greater risk if you have:   Fair skin, light-colored hair, or light-colored eyes  Many moles or abnormal moles on your skin  A history of sunburns from sunlight or tanning beds  A family history of skin cancer  A history of exposure to radiation or chemicals  A weakened immune system  If you have had skin cancer in the past, you are at risk for recurring skin cancer.   How to check your skin  Do your monthly skin checkups in front of a full-length mirror. Check all parts of your body, including your:   Head (ears, face, neck, and scalp)  Torso (front, back, and sides)  Arms (tops, undersides, upper, and lower armpits)  Hands (palms, backs, and fingers, including under the nails)  Buttocks  and genitals  Legs (front, back, and sides)  Feet (tops, soles, toes, including under the nails, and between toes)  If you have a lot of moles, take digital photos of them each month. Make sure to take photos both up close and from a distance. These can help you see if any moles change over time.   Most skin changes are not cancer. But if you see any changes in your skin, call your doctor right away. Only he or she can diagnose a problem. If you have skin cancer, seeing your doctor can be the first step toward getting the treatment that could save your life.     [employer] last reviewed this educational content on 4/1/2019 2000-2020 [employer]. [address] [place] [address], [place] [address]. All rights reserved. This information is not intended as a substitute for professional medical care. Always follow your healthcare professional's instructions.    When should I call my doctor?  If you are worsening or not improving, please, contact us or seek urgent care as noted below.     Who should I call with questions (adults)?  [facility] (adult and pediatric): [phone]  [facility] (adult): [phone]  For urgent needs outside of business hours call the [facility] at [phone] and ask for the dermatology resident on call to be paged  If this is a medical emergency and you are unable to reach an ER, Call [phone]

## 2022-12-15 NOTE — PROGRESS NOTES
[facility] Dermatology Note  Encounter Date: Dec 15, 2022  Office Visit     Dermatology Problem List:  1. NMSC  - L thigh, BCC, s/p excision 10/25/22  - FBSE 12/15/22  ___________________________________    Assessment & Plan:     # History of NMSC. No evidence of recurrent disease.  - Continue photoprotection - recommend SPF 30 or higher with frequent reapplication  - Continue Q6m skin exams  - Advised to monitor for changing, non-healing, bleeding, painful, or otherwise symptomatic lesions    # Benign lesions: Multiple benign nevi, solar lentigos, seborrheic keratoses, cherry angiomas. Explained to patient benign nature of lesion. No treatment is necessary at this time unless the lesion changes or becomes symptomatic.      - ABCDs of melanoma were discussed and self skin checks were advised.  - Sun precaution was advised including the use of sun screens of SPF 30 or higher, sun protective clothing, and avoidance of tanning beds.     Procedures Performed:   None    Follow-up: 6 months    Staff and Resident:     MD Dr. Blade Barron  ____________________________________________    CC: Skin Check (FBSE.  Spots of concern on the back.)    HPI:  Ms. Ashly Tan is a(n) 52 year old female who presents today as a return patient for FBSE. She was last seen by Dr. Kennedy for an excision of a BCC on his left thigh. She has one scaly lesion on her back that she is wondering what is.     Patient is otherwise feeling well, without additional skin concerns.    Labs Reviewed:  N/A    Physical Exam:  Vitals: There were no vitals taken for this visit.  SKIN: Total skin excluding the inguinal area was performed. The exam included the head/face, neck, both arms, chest, back, abdomen, both legs, digits and/or nails.     - R breast with slightly irregularly bordered brown nevi with regular pigment pattern on dermoscopy  - There are waxy stuck on tan to brown papules on the back and stomach.   - Well  healing scar on the L lateral proximal thigh.  - There are dome shaped bright red papules on the stomach.  - Regularly shaped medium brown macules on legs, face, trunk and arms  - No other lesions of concern on areas examined.     Medications:  Current Outpatient Medications   Medication     CALCIUM PO     cephALEXin (KEFLEX) 500 MG capsule     citalopram (CELEXA) 40 MG tablet     Ferrous Sulfate (IRON SUPPLEMENT PO)     ibuprofen (ADVIL/MOTRIN) 200 MG tablet     Pseudoephedrine-Ibuprofen (ADVIL COLD/SINUS PO)     No current facility-administered medications for this visit.      Past Medical History:   Patient Active Problem List   Diagnosis     Allergic rhinitis     CARDIOVASCULAR SCREENING; LDL GOAL LESS THAN 160     Major depression in complete remission (H)     Recurrent cystitis     Basal cell carcinoma (BCC) of skin of left lower extremity including hip     Past Medical History:   Diagnosis Date     Allergic rhinitis     vs Mechanical Rhinitis     Basal cell carcinoma (BCC) of skin of left lower extremity including hip 9/8/2022     Depressive disorder 9/27/2011     History of asthma     Occassional     Major depression in complete remission (H) 9/27/2011     Uncomplicated asthma several years ago    Excercise induced       CC Referred Self, MD  No address on file on close of this encounter.

## 2022-12-15 NOTE — LETTER
12/15/2022       RE: Ashly Tan  [address]  [place] [address]     Dear Colleague,    Thank you for referring your patient, Ashly Tan, to the [facility]MATOLOGY [facility] [place] at [facility]. Please see a copy of my visit note below.    [facility] Dermatology Note  Encounter Date: Dec 15, 2022  Office Visit     Dermatology Problem List:  1. NMSC  - L thigh, BCC, s/p excision 10/25/22  - FBSE 12/15/22  ___________________________________    Assessment & Plan:     # History of NMSC. No evidence of recurrent disease.  - Continue photoprotection - recommend SPF 30 or higher with frequent reapplication  - Continue Q6m skin exams  - Advised to monitor for changing, non-healing, bleeding, painful, or otherwise symptomatic lesions    # Benign lesions: Multiple benign nevi, solar lentigos, seborrheic keratoses, cherry angiomas. Explained to patient benign nature of lesion. No treatment is necessary at this time unless the lesion changes or becomes symptomatic.      - ABCDs of melanoma were discussed and self skin checks were advised.  - Sun precaution was advised including the use of sun screens of SPF 30 or higher, sun protective clothing, and avoidance of tanning beds.     Procedures Performed:   None    Follow-up: 6 months    Staff and Resident:     MD Dr. Bldae Barron  ____________________________________________    CC: Skin Check (FBSE.  Spots of concern on the back.)    HPI:  Ms. Ashly Tan is a(n) 52 year old female who presents today as a return patient for FBSE. She was last seen by Dr. Kennedy for an excision of a BCC on his left thigh. She has one scaly lesion on her back that she is wondering what is.     Patient is otherwise feeling well, without additional skin concerns.    Labs Reviewed:  N/A    Physical Exam:  Vitals: There were no vitals taken for this visit.  SKIN: Total skin  excluding the inguinal area was performed. The exam included the head/face, neck, both arms, chest, back, abdomen, both legs, digits and/or nails.     - R breast with slightly irregularly bordered brown nevi with regular pigment pattern on dermoscopy  - There are waxy stuck on tan to brown papules on the back and stomach.   - Well healing scar on the L lateral proximal thigh.  - There are dome shaped bright red papules on the stomach.  - Regularly shaped medium brown macules on legs, face, trunk and arms  - No other lesions of concern on areas examined.     Medications:  Current Outpatient Medications   Medication     CALCIUM PO     cephALEXin (KEFLEX) 500 MG capsule     citalopram (CELEXA) 40 MG tablet     Ferrous Sulfate (IRON SUPPLEMENT PO)     ibuprofen (ADVIL/MOTRIN) 200 MG tablet     Pseudoephedrine-Ibuprofen (ADVIL COLD/SINUS PO)     No current facility-administered medications for this visit.      Past Medical History:   Patient Active Problem List   Diagnosis     Allergic rhinitis     CARDIOVASCULAR SCREENING; LDL GOAL LESS THAN 160     Major depression in complete remission (H)     Recurrent cystitis     Basal cell carcinoma (BCC) of skin of left lower extremity including hip     Past Medical History:   Diagnosis Date     Allergic rhinitis     vs Mechanical Rhinitis     Basal cell carcinoma (BCC) of skin of left lower extremity including hip 9/8/2022     Depressive disorder 9/27/2011     History of asthma     Occassional     Major depression in complete remission (H) 9/27/2011     Uncomplicated asthma several years ago    Excercise induced       CC Referred Self, MD  No address on file on close of this encounter.

## 2022-12-22 NOTE — PROGRESS NOTES
SUBJECTIVE:  HPI:  Ashly Tan  Is a 52 year old female who presents today for new patient evaluation of low back pain upon referral from Dr. Amber Ahn based on a MyChart request from the patient.  No other significant history noted.    The patient clarifies that for about a year without any particular incident, she started noticing lumbosacral midline pain worsening as noted below.  Sometimes she will get an achiness in both buttocks and in the proximal posterior bilateral thighs most of the time that is not there.  There is no true numbness tingling weakness or pain in her legs.  There is no bowel or bladder dysfunction and no saddle anesthesia.  She is going through menopause and initially she thought that this might be related to that because during her periods the pain would be worse but now it is lasting longer so she thinks it might be unrelated to that.  She stopped doing Pilates and dance because that would aggravate her pain and then she would notice a sharp pain when she would transition from sitting to standing.      SYMPTOMS WORSENED WITH twisting, working out, stretching, bending, prolonged sitting at work, pelvic tilts    SYMPTOMS IMPROVED WITH heat, relaxed sitting.    Pain score and diagram reviewed.  See questionnaire.      ROS: .  Otherwise negative for bowel/bladder dysfunction, balance changes, headache, leg pain/numbness/weakness, fevers, chills, night sweats, unexplained weight loss;  otherwise unremarkable.   See the patient's intake questionnaire from today for details.    Treatment to Date: None.  No PT, no chiropractor    MEDICATIONS:  Reviewed.    ALLERGIES:  Reviewed.     Reviewed past medical, surgical, and family history.    Pertinent for depression    SOCIAL HX: She is a self-employed author/ and writes romance novels.  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  Sports hobbies and activities she used to do Pilates and dance but stopped that.  Binge watching  romance TV  series.      OBJECTIVE:    --CONSTITUTIONAL:   No acute distress.  The patient is well nourished and well groomed.  She transitions well and moves fluidly about the room.  BMI is appropriate.  --PSYCHIATRIC:  Appropriate mood and affect. The patient is awake, alert, oriented to person, place, time and answering questions appropriately with clear speech.    --SKIN:  Skin over the face, bilateral lower extremities, and posterior torso is clean, dry, intact without rashes.    --RESPIRATORY: Normal respiratory excursion and effort, and no dyspnea.   --GAIT:  is non-antalgic. Flat foot, heel and toe walking:  normal   .  Squat and rise   normal    .  --STANDING EXAMINATION:    Symmetry of spine/pelvis   unremarkable   .      Range of motion full and fluid and the only painful ranges right sidebending and only slight discomfort noted with.   Standing flexion   negative   .    Alok's sign   negative    .     Stork test   negative    .   --NEUROLOGICAL:     ROMBERG, TANDEM WALK, PRONATOR DRIFT:   Normal.   .  SENSATION to light touch is intact in bilateral thighs, lower legs and feet.   REFLEXES:  patellar 2+, and achilles 2+.  Babinski is negative. No clonus.  MANUAL MOTOR TESTING:  L3- S1 Myotomes, Femoral, Obturator, Peroneal and Tibial nerves 5/5   DURAL STRETCH TESTS:  SLR negative.  Femoral Stretch Test not done.   --PELVIC/HIP JOINTS:                Long Sitting   negative   .    Hip scour   negative   .    Hip Impingement   negative   .   FLORY   negative    .     Piriformis   negative   .  Negative trochanteric tenderness  Spring testing negative SI and spine.      PELVIC ALIGNMENT right posterior innominate rotation.   --LUMBAR/GLUTEAL MUSCLES: Negative spasm or trigger points or tenderness.    --ABDOMINAL:  Non-distended.  Nontender  --VASCULAR: Femoral pulses 2+. Lower extremity capillary refill, temperature and color normal.    Procedure note-OMT:  Manual medicine restore normal pelvic alignment.  She had  very little pain to begin with and excellent function.  Afterwards she had no pain with right sidebending         IMAGING: No spinal imaging    ASSESSMENT: Ashly Tan is a 52 year old female who presents  today for new patient evaluation of:      Low back pain    Pelvic Joint Dysfunction manifesting as a right posterior innominate rotation.  She had minimal symptoms to begin with and some improvement with pain the only painful range before hand which was right side bent    Neurologically intact      PLAN:  Pelvic stabilization PT with Ramesh.  I would encourage her to resume Pilates and if she is feeling good with that and add on some of the dance exercises and return to clinic if she is not happy with her outcome.  At that point I will probably do x-rays and maybe an MRI but at this point I do not think imaging is necessary.  Pelvic Joint Dysfunction self correct taught and she should do this each morning.    Advised patient to call or return early if symptoms worsen, or having problems controlling bladder and bowel function or worsening leg weakness.     Please note: Voice recognition software was used in this dictation.  It may therefore contain typographical errors.    Mark Pop MD

## 2022-12-29 ENCOUNTER — PRE VISIT (OUTPATIENT)
Dept: NEUROSURGERY | Facility: CLINIC | Age: 52
End: 2022-12-29

## 2022-12-29 ENCOUNTER — OFFICE VISIT (OUTPATIENT)
Dept: NEUROSURGERY | Facility: CLINIC | Age: 52
End: 2022-12-29
Payer: COMMERCIAL

## 2022-12-29 VITALS
HEART RATE: 73 BPM | BODY MASS INDEX: 26.97 KG/M2 | SYSTOLIC BLOOD PRESSURE: 120 MMHG | WEIGHT: 180 LBS | DIASTOLIC BLOOD PRESSURE: 78 MMHG

## 2022-12-29 DIAGNOSIS — M99.04 SACROILIAC JOINT SOMATIC DYSFUNCTION: Primary | ICD-10-CM

## 2022-12-29 DIAGNOSIS — M54.50 MIDLINE LOW BACK PAIN WITHOUT SCIATICA, UNSPECIFIED CHRONICITY: ICD-10-CM

## 2022-12-29 PROCEDURE — 98925 OSTEOPATH MANJ 1-2 REGIONS: CPT | Performed by: PREVENTIVE MEDICINE

## 2022-12-29 PROCEDURE — 99203 OFFICE O/P NEW LOW 30 MIN: CPT | Mod: 25 | Performed by: PREVENTIVE MEDICINE

## 2022-12-29 ASSESSMENT — PAIN SCALES - GENERAL: PAINLEVEL: MILD PAIN (3)

## 2022-12-29 NOTE — LETTER
12/29/2022         RE: Ashly Tan  [address]  [place] [address]        Dear Colleague,    Thank you for referring your patient, Ashly Tan, to the [facility] [place]. Please see a copy of my visit note below.        SUBJECTIVE:  HPI:  Ashly Tan  Is a 52 year old female who presents today for new patient evaluation of low back pain upon referral from Dr. Amber Ahn based on a MyChart request from the patient.  No other significant history noted.    The patient clarifies that for about a year without any particular incident, she started noticing lumbosacral midline pain worsening as noted below.  Sometimes she will get an achiness in both buttocks and in the proximal posterior bilateral thighs most of the time that is not there.  There is no true numbness tingling weakness or pain in her legs.  There is no bowel or bladder dysfunction and no saddle anesthesia.  She is going through menopause and initially she thought that this might be related to that because during her periods the pain would be worse but now it is lasting longer so she thinks it might be unrelated to that.  She stopped doing Pilates and dance because that would aggravate her pain and then she would notice a sharp pain when she would transition from sitting to standing.      SYMPTOMS WORSENED WITH twisting, working out, stretching, bending, prolonged sitting at work, pelvic tilts    SYMPTOMS IMPROVED WITH heat, relaxed sitting.    Pain score and diagram reviewed.  See questionnaire.      ROS: .  Otherwise negative for bowel/bladder dysfunction, balance changes, headache, leg pain/numbness/weakness, fevers, chills, night sweats, unexplained weight loss;  otherwise unremarkable.   See the patient's intake questionnaire from today for details.    Treatment to Date: None.  No PT, no chiropractor    MEDICATIONS:  Reviewed.    ALLERGIES:  Reviewed.     Reviewed past medical, surgical,  and family history.    Pertinent for depression    SOCIAL HX: She is a self-employed author/ and writes romance novels.  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  Sports hobbies and activities she used to do Pilates and dance but stopped that.  Binge watching  romance TV series.      OBJECTIVE:    --CONSTITUTIONAL:   No acute distress.  The patient is well nourished and well groomed.  She transitions well and moves fluidly about the room.  BMI is appropriate.  --PSYCHIATRIC:  Appropriate mood and affect. The patient is awake, alert, oriented to person, place, time and answering questions appropriately with clear speech.    --SKIN:  Skin over the face, bilateral lower extremities, and posterior torso is clean, dry, intact without rashes.    --RESPIRATORY: Normal respiratory excursion and effort, and no dyspnea.   --GAIT:  is non-antalgic. Flat foot, heel and toe walking:  normal   .  Squat and rise   normal    .  --STANDING EXAMINATION:    Symmetry of spine/pelvis   unremarkable   .      Range of motion full and fluid and the only painful ranges right sidebending and only slight discomfort noted with.   Standing flexion   negative   .    Alok's sign   negative    .     Stork test   negative    .   --NEUROLOGICAL:     ROMBERG, TANDEM WALK, PRONATOR DRIFT:   Normal.   .  SENSATION to light touch is intact in bilateral thighs, lower legs and feet.   REFLEXES:  patellar 2+, and achilles 2+.  Babinski is negative. No clonus.  MANUAL MOTOR TESTING:  L3- S1 Myotomes, Femoral, Obturator, Peroneal and Tibial nerves 5/5   DURAL STRETCH TESTS:  SLR negative.  Femoral Stretch Test not done.   --PELVIC/HIP JOINTS:                Long Sitting   negative   .    Hip scour   negative   .    Hip Impingement   negative   .   FLORY   negative    .     Piriformis   negative   .  Negative trochanteric tenderness  Spring testing negative SI and spine.      PELVIC ALIGNMENT right posterior innominate rotation.   --LUMBAR/GLUTEAL  MUSCLES: Negative spasm or trigger points or tenderness.    --ABDOMINAL:  Non-distended.  Nontender  --VASCULAR: Femoral pulses 2+. Lower extremity capillary refill, temperature and color normal.    Procedure note-OMT:  Manual medicine restore normal pelvic alignment.  She had very little pain to begin with and excellent function.  Afterwards she had no pain with right sidebending         IMAGING: No spinal imaging    ASSESSMENT: Ashly Tan is a 52 year old female who presents  today for new patient evaluation of:      Low back pain    Pelvic Joint Dysfunction manifesting as a right posterior innominate rotation.  She had minimal symptoms to begin with and some improvement with pain the only painful range before hand which was right side bent    Neurologically intact      PLAN:  Pelvic stabilization PT with Ramesh.  I would encourage her to resume Pilates and if she is feeling good with that and add on some of the dance exercises and return to clinic if she is not happy with her outcome.  At that point I will probably do x-rays and maybe an MRI but at this point I do not think imaging is necessary.  Pelvic Joint Dysfunction self correct taught and she should do this each morning.    Advised patient to call or return early if symptoms worsen, or having problems controlling bladder and bowel function or worsening leg weakness.     Please note: Voice recognition software was used in this dictation.  It may therefore contain typographical errors.    Mark Pop MD             Again, thank you for allowing me to participate in the care of your patient.        Sincerely,        Mark Pop MD

## 2022-12-29 NOTE — NURSING NOTE
Reason For Visit:   Chief Complaint   Patient presents with     Consult     Low back pain, middle         Occupation: Self-employed  Currently working? Yes.  Work status?  Part-time.    Sports: None  Activities: Previously dance, pilates      Wt 81.6 kg (180 lb)   BMI 26.97 kg/m        No Known Allergies    Current Outpatient Medications   Medication     CALCIUM PO     cephALEXin (KEFLEX) 500 MG capsule     citalopram (CELEXA) 40 MG tablet     Ferrous Sulfate (IRON SUPPLEMENT PO)     ibuprofen (ADVIL/MOTRIN) 200 MG tablet     Pseudoephedrine-Ibuprofen (ADVIL COLD/SINUS PO)     No current facility-administered medications for this visit.         Jacqueline Elena, EMT

## 2023-01-02 ENCOUNTER — THERAPY VISIT (OUTPATIENT)
Dept: PHYSICAL THERAPY | Facility: CLINIC | Age: 53
End: 2023-01-02
Attending: PREVENTIVE MEDICINE
Payer: COMMERCIAL

## 2023-01-02 DIAGNOSIS — M54.50 MIDLINE LOW BACK PAIN WITHOUT SCIATICA, UNSPECIFIED CHRONICITY: ICD-10-CM

## 2023-01-02 DIAGNOSIS — M99.04 SACROILIAC JOINT SOMATIC DYSFUNCTION: ICD-10-CM

## 2023-01-02 PROCEDURE — 97161 PT EVAL LOW COMPLEX 20 MIN: CPT | Mod: GP

## 2023-01-02 PROCEDURE — 97110 THERAPEUTIC EXERCISES: CPT | Mod: GP

## 2023-01-02 NOTE — PROGRESS NOTES
Physical Therapy Initial Evaluation  Subjective:  The history is provided by the patient. No  was used.   Patient Health History  Ashly Tan being seen for lower back pain from Pelvic Joint Dysfunction.       Problem occurred: Not sure - there wasn't one incident   Pain is reported as 3/10 on pain scale.  General health as reported by patient is excellent.  Pertinent medical history includes: cancer, depression and menopausal.     Medical allergies: none.   Surgeries include:  Other. Other surgery history details: appendectomy, removal of basal cell carcinoma.    Current medications:  Anti-depressants.    Current occupation is self-employed - author/.   Primary job tasks include:  Computer work and prolonged sitting.                  Therapist Generated HPI Evaluation  Problem details: She reports she has pain in the lower spine area that gets irritated when she lifts objects.  It's better with heat. Sometimes it's worse than others. She doesn't know exactly when it came on. It sometimes will cause aching into the right leg but it's rare. She also reports pain with working out and yoga where even child's pose bothers it..         Type of problem:  Lumbar and sacroiliac.    This is a chronic condition.  Condition occurred with:  Insidious onset.    Patient reports pain:  Lower lumbar spine.  Pain is described as aching and sharp     Since onset symptoms are unchanged.  Associated symptoms:  Loss of motion and loss of motion/stiffness. Symptoms are exacerbated by lifting, standing, sitting, bending and carrying        Restrictions due to condition include:  Working in normal job without restrictions.  Barriers include:  None as reported by patient.                        Objective:  System         Lumbar/SI Evaluation  ROM:    AROM Lumbar:   Flexion:        Full but with repetition increased low back pain  Ext:                    Mild limitation and had no impact on symptoms   Side Bend:         Left:     Right:   Rotation:           Left:     Right:   Side Glide:        Left:     Right:         Strength: 5/5 in legs          Neural Tension/Mobility:      Right side:   Slump positive.          SI joint/Sacrum:    Found R Posterior innominate rotation        Right negative at:  Forward bend seated; Squish; Ilium Ventromedial; Thigh thrust and Sacral thrust    Sacral conclusion right:  Posterior inominate                                               Emi Lumbar Evaluation        Test Movements:  FIS: During: produces  After: worse  Mechanical Response: no effectPretest Movements: Slight to no low back pain  Repeat FIS: During: produces  After: worse  Mechanical Response: no effect      EIL: During: no effect  After: no effect  Mechanical Response: no effect  Repeat EIL: During: no effect  After: no effect  Mechanical Response: no effect        Conclusion: dysfunction  Principle of Treatment:    Flexion: Possible flexion responder despite no response from extension                                             ROS    Assessment/Plan:    Patient is a 52 year old female with lumbar, sacral and pelvic complaints.    Patient has the following significant findings with corresponding treatment plan.                Diagnosis 1:  Low Back Pain  Pain -  hot/cold therapy, manual therapy, self management, education, directional preference exercise and home program  Decreased ROM/flexibility - manual therapy, therapeutic exercise, therapeutic activity and home program  Decreased strength - therapeutic exercise, therapeutic activities and home program    Therapy Evaluation Codes:   1) History comprised of:   Personal factors that impact the plan of care:      None.    Comorbidity factors that impact the plan of care are:      None.     Medications impacting care: Anti-depressant.  2) Examination of Body Systems comprised of:   Body structures and functions that impact the plan of care:      Lumbar spine, Pelvis  and Sacral illiac joint.   Activity limitations that impact the plan of care are:      Lifting, Reading/Computer work, Sitting, Squatting/kneeling and Standing.  3) Clinical presentation characteristics are:   Stable/Uncomplicated.  4) Decision-Making    Low complexity using standardized patient assessment instrument and/or measureable assessment of functional outcome.  Cumulative Therapy Evaluation is: Low complexity.    Previous and current functional limitations:  (See Goal Flow Sheet for this information)    Short term and Long term goals: (See Goal Flow Sheet for this information)     Communication ability:  Patient appears to be able to clearly communicate and understand verbal and written communication and follow directions correctly.  Treatment Explanation - The following has been discussed with the patient:   RX ordered/plan of care  Anticipated outcomes  Possible risks and side effects  This patient would benefit from PT intervention to resume normal activities.   Rehab potential is good.    Frequency:  One X week, once daily  Duration:  for 8 weeks  Discharge Plan:  Achieve all LTG.  Independent in home treatment program.  Reach maximal therapeutic benefit.    Please refer to the daily flowsheet for treatment today, total treatment time and time spent performing 1:1 timed codes.

## 2023-01-09 ENCOUNTER — THERAPY VISIT (OUTPATIENT)
Dept: PHYSICAL THERAPY | Facility: CLINIC | Age: 53
End: 2023-01-09
Attending: PREVENTIVE MEDICINE
Payer: COMMERCIAL

## 2023-01-09 DIAGNOSIS — M54.50 CHRONIC BILATERAL LOW BACK PAIN WITHOUT SCIATICA: ICD-10-CM

## 2023-01-09 DIAGNOSIS — G89.29 CHRONIC BILATERAL LOW BACK PAIN WITHOUT SCIATICA: ICD-10-CM

## 2023-01-09 PROCEDURE — 97110 THERAPEUTIC EXERCISES: CPT | Mod: GP

## 2023-01-16 ENCOUNTER — THERAPY VISIT (OUTPATIENT)
Dept: PHYSICAL THERAPY | Facility: CLINIC | Age: 53
End: 2023-01-16
Attending: PREVENTIVE MEDICINE
Payer: COMMERCIAL

## 2023-01-16 DIAGNOSIS — M54.50 CHRONIC BILATERAL LOW BACK PAIN WITHOUT SCIATICA: Primary | ICD-10-CM

## 2023-01-16 DIAGNOSIS — G89.29 CHRONIC BILATERAL LOW BACK PAIN WITHOUT SCIATICA: Primary | ICD-10-CM

## 2023-01-16 PROCEDURE — 97110 THERAPEUTIC EXERCISES: CPT | Mod: GP

## 2023-01-30 ENCOUNTER — THERAPY VISIT (OUTPATIENT)
Dept: PHYSICAL THERAPY | Facility: CLINIC | Age: 53
End: 2023-01-30
Payer: COMMERCIAL

## 2023-01-30 DIAGNOSIS — M54.50 CHRONIC BILATERAL LOW BACK PAIN WITHOUT SCIATICA: Primary | ICD-10-CM

## 2023-01-30 DIAGNOSIS — G89.29 CHRONIC BILATERAL LOW BACK PAIN WITHOUT SCIATICA: Primary | ICD-10-CM

## 2023-01-30 PROCEDURE — 97110 THERAPEUTIC EXERCISES: CPT | Mod: GP

## 2023-02-06 ENCOUNTER — THERAPY VISIT (OUTPATIENT)
Dept: PHYSICAL THERAPY | Facility: CLINIC | Age: 53
End: 2023-02-06
Payer: COMMERCIAL

## 2023-02-06 DIAGNOSIS — M54.50 CHRONIC BILATERAL LOW BACK PAIN WITHOUT SCIATICA: Primary | ICD-10-CM

## 2023-02-06 DIAGNOSIS — G89.29 CHRONIC BILATERAL LOW BACK PAIN WITHOUT SCIATICA: Primary | ICD-10-CM

## 2023-02-06 PROCEDURE — 97110 THERAPEUTIC EXERCISES: CPT | Mod: GP

## 2023-02-27 ENCOUNTER — THERAPY VISIT (OUTPATIENT)
Dept: PHYSICAL THERAPY | Facility: CLINIC | Age: 53
End: 2023-02-27
Payer: COMMERCIAL

## 2023-02-27 DIAGNOSIS — M54.50 CHRONIC BILATERAL LOW BACK PAIN WITHOUT SCIATICA: Primary | ICD-10-CM

## 2023-02-27 DIAGNOSIS — G89.29 CHRONIC BILATERAL LOW BACK PAIN WITHOUT SCIATICA: Primary | ICD-10-CM

## 2023-02-27 PROCEDURE — 97110 THERAPEUTIC EXERCISES: CPT | Mod: GP

## 2023-02-27 NOTE — PROGRESS NOTES
"Subjective:  HPI  Physical Exam  Oswestry Score: 16 %                 Objective:  System    Physical Exam    General     ROS    Assessment/Plan:    DISCHARGE REPORT    Progress reporting period is from 1/2/2023 to 2/27/2023.       SUBJECTIVE   Subjective: She reports she returned to pilates and doing the exercises. Things were feeling good, but then she developed a return of symptoms especially when she lays on her back.  She feels like her pain and symptoms haven't gotten any better and in fact feels worse ocer the last three weeks.  She reports during pilates she would feel no pain, but she gets her pain when she lays on her back or stands and does dishes.     Current Pain level: 3/10.      Initial Pain level: 3/10.   Changes in function:  Yes (See Goal flowsheet attached for changes in current functional level)  Adverse reaction to treatment or activity: None    OBJECTIVE  Changes noted in objective findings:  Yes  Objective: Supine Lying: Causes Pain in the low back.  Prone Lying: This is something that she tries to limit at home, but does feel better than in standing.  Gaenslen's Positive on the Right.  Supine to sit: Right Short to long (Posterior innominate Rotation).  Standing Lumbar ROM: Flexion Full and painfree, Extension: Moderate Limitation but not \"Too bad\". Right Side Bend: Full but slight pain.  Left Side Bend: Mild limitation and slight pain. Sacral Thrust: Negative.  Thigh Trust Negative.  TTP Bilaterally to the glute medius, but no other locations tender on palpation.  ROBERT: 6 (16%).  Tano: 0 (LOW)     ASSESSMENT/PLAN  Updated problem list and treatment plan: Diagnosis 1:  Pelvic Joint Dysfunction  Pain -  hot/cold therapy, manual therapy, self management, education, directional preference exercise and home program  Decreased ROM/flexibility - manual therapy, therapeutic exercise, therapeutic activity and home program  Decreased strength - therapeutic exercise, therapeutic activities and home " program  Diagnosis 2:  Low back pain   Pain -  hot/cold therapy, manual therapy, self management, education, directional preference exercise and home program  Decreased ROM/flexibility - manual therapy, therapeutic exercise, therapeutic activity and home program  Decreased strength - therapeutic exercise, therapeutic activities and home program  STG/LTGs have been met or progress has been made towards goals:  Yes (See Goal flow sheet completed today.)  Assessment of Progress: The patient's progress has plateaued.  Self Management Plans:  Patient has been instructed in a home treatment program.  Patient is independent in a home treatment program.  Patient  has been instructed in self management of symptoms.  Patient is independent in self management of symptoms.  I have re-evaluated this patient and find that the nature, scope, duration and intensity of the therapy is appropriate for the medical condition of the patient.  Ashly continues to require the following intervention to meet STG and LTG's:  PT intervention is no longer required to meet STG/LTG.    Recommendations:  This patient would benefit from further evaluation and return to referring provider.    Summary:  Brittany was diligent with her exercises and after the first day was able to complete 20 reps of the gluteal myofascial protocol.  She continued to strengthening her glutes and stabilize her pelvis with minimal change in symptoms.  We then decided to attempted new exercises such as directional preference / MDT,  Core Stability, and general conditioning.  She demonstrated no relief or change of symptoms with any of the tried interventions from above.  Today she arrives after a week of pain and low tolerance for laying supine despite performing all of her exercises well.  We recommend she return to her referring provider to discuss additional treatment options.    Please refer to the daily flowsheet for treatment today, total treatment time and time spent  performing 1:1 timed codes.           allopurinol

## 2023-03-22 NOTE — PROGRESS NOTES
Subjective:  Ashly Tan is a 53 year old female who presents today for follow-up regarding     Low back pain    Pelvic Joint Dysfunction manifesting as a right posterior innominate rotation.  She had minimal symptoms to begin with and some improvement with pain on the only painful range before hand which was right side bent    Neurologically intact  PT with Ramesh add-on previous activities as tolerated including Pilates and dance exercises and return to clinic as needed: Consider x-rays and MRI.  Pelvic Joint Dysfunction self-correction was taught.    PRIOR HISTORY from 12/29/2022:  She was seen for evaluation of low back pain upon referral from Dr. Amber Ahn based on a MyChart request from the patient.  No other significant history noted.     The patient clarifies that for about a year without any particular incident, she started noticing lumbosacral midline pain worsening as noted below.  Sometimes she will get an achiness in both buttocks and in the proximal posterior bilateral thighs most of the time that is not there.  There is no true numbness tingling weakness or pain in her legs.  There is no bowel or bladder dysfunction and no saddle anesthesia.  She is going through menopause and initially she thought that this might be related to that because during her periods the pain would be worse but now it is lasting longer so she thinks it might be unrelated to that.  She stopped doing Pilates and dance because that would aggravate her pain and then she would notice a sharp pain when she would transition from sitting to standing.    INTERIM HISTORY:    Mary Carmen has been through therapy with Ramesh and they have stabilize the pelvis and done a bunch of other things and actually her pain is worse now than before.  Remains midline with no radiating leg pain numbness tingling or weakness bowel or bladder dysfunction or saddle anesthesia.  She has tried resuming activities but the back pain is still  limiting.    Reviewed past medical, surgical, and family history.               Pertinent for depression     SOCIAL HX: She is a self-employed author/ and writes romance novels.  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  Sports hobbies and activities she used to do Pilates and dance but stopped that.  Binge watching  romance TV series.       Objective:    IMAGING:  No spinal imaging      EXAMINATION:    CONSTITUTIONAL:  Vital signs as above.  No acute distress.  The patient is well nourished and well groomed.  She transitions well and moves fluidly  PSYCHIATRIC:  The patient is awake, alert, oriented to person, place and time.  The patient is answering questions appropriately with clear speech.  Normal affect.  Able to follow commands  MUSCULOSKELETAL:  Gait is non-antalgic.  The patient is able to heel and toe walk without any difficulty.   Squat and rise normal.   .  Back ROM: Full with some pain on flexion limited by 50% in extension with midline pain and it worsens more with combined right than left sidebending and rotation..     NEUROLOGICAL:    Motor:  L3-S1 myotomes 5/5     Sensation to light touch is intact in the bilateral lower extremities.    SLR negative    Pelvis: No recurrent Pelvic Joint Dysfunction.  Standing flexion, Alok sign, and stork test negative.  Minimal discomfort noted with bilateral SI joint spring testing but not lumbar.  No lumbar or gluteal muscle spasm, trigger points, or tender    Assessment     Ashly Tan  is a 53 year old y.o. female who presents today for follow-up regarding     Resolved Pelvic Joint Dysfunction-no improvement in symptoms    I suspect right more than left lower facet mediated pain      Plan:  Lumbar x-ray and MRI and return to clinic to review the images and determine next steps.  I am inclined to proceed with an RFA work-up on the lower right lumbar segments.    Advised patient to call or return early if symptoms worsen, or having problems controlling  bladder and bowel function or worsening leg weakness.     Please note: Voice recognition software was used in this dictation.  It may therefore contain typographical errors.  Mark Pop MD

## 2023-03-29 ENCOUNTER — OFFICE VISIT (OUTPATIENT)
Dept: NEUROSURGERY | Facility: CLINIC | Age: 53
End: 2023-03-29
Payer: COMMERCIAL

## 2023-03-29 ENCOUNTER — ANCILLARY PROCEDURE (OUTPATIENT)
Dept: GENERAL RADIOLOGY | Facility: CLINIC | Age: 53
End: 2023-03-29
Attending: PREVENTIVE MEDICINE
Payer: COMMERCIAL

## 2023-03-29 VITALS
WEIGHT: 180 LBS | SYSTOLIC BLOOD PRESSURE: 124 MMHG | HEART RATE: 76 BPM | BODY MASS INDEX: 26.97 KG/M2 | DIASTOLIC BLOOD PRESSURE: 81 MMHG

## 2023-03-29 DIAGNOSIS — G89.29 CHRONIC MIDLINE LOW BACK PAIN WITHOUT SCIATICA: Primary | ICD-10-CM

## 2023-03-29 DIAGNOSIS — G89.29 CHRONIC MIDLINE LOW BACK PAIN WITHOUT SCIATICA: ICD-10-CM

## 2023-03-29 DIAGNOSIS — M54.50 CHRONIC MIDLINE LOW BACK PAIN WITHOUT SCIATICA: Primary | ICD-10-CM

## 2023-03-29 DIAGNOSIS — M54.50 CHRONIC MIDLINE LOW BACK PAIN WITHOUT SCIATICA: ICD-10-CM

## 2023-03-29 PROCEDURE — 72100 X-RAY EXAM L-S SPINE 2/3 VWS: CPT | Performed by: RADIOLOGY

## 2023-03-29 PROCEDURE — 99213 OFFICE O/P EST LOW 20 MIN: CPT | Performed by: PREVENTIVE MEDICINE

## 2023-03-29 NOTE — PATIENT INSTRUCTIONS
It is good to see you Mary Carmen and I am sorry that you have not responded to a trial of conservative care.  Lets get some imaging and talk about them at the next visit and decide on the next steps.

## 2023-03-29 NOTE — NURSING NOTE
Reason For Visit:   Chief Complaint   Patient presents with     RECHECK     Back pain      Occupation: Self-employed  Currently working? Yes.  Work status?  Part-time.     Sports: None  Activities: Previously dance, pilates       /81   Pulse 76   Wt 81.6 kg (180 lb)   BMI 26.97 kg/m        No Known Allergies    Current Outpatient Medications   Medication     CALCIUM PO     cephALEXin (KEFLEX) 500 MG capsule     citalopram (CELEXA) 40 MG tablet     Ferrous Sulfate (IRON SUPPLEMENT PO)     ibuprofen (ADVIL/MOTRIN) 200 MG tablet     Magnesium Chloride (MAGNESIUM DR PO)     Pseudoephedrine-Ibuprofen (ADVIL COLD/SINUS PO)     No current facility-administered medications for this visit.         Darla Severin-Brown, LPN

## 2023-03-29 NOTE — LETTER
3/29/2023         RE: Ashly Tan  [address]  [place] [address]        Dear Colleague,    Thank you for referring your patient, Ashly Tan, to the [facility]UROSURGERY [facility] [place]. Please see a copy of my visit note below.      Subjective:  Ashly Tan is a 53 year old female who presents today for follow-up regarding     Low back pain    Pelvic Joint Dysfunction manifesting as a right posterior innominate rotation.  She had minimal symptoms to begin with and some improvement with pain on the only painful range before hand which was right side bent    Neurologically intact  PT with Ramesh add-on previous activities as tolerated including Pilates and dance exercises and return to clinic as needed: Consider x-rays and MRI.  Pelvic Joint Dysfunction self-correction was taught.    PRIOR HISTORY from 12/29/2022:  She was seen for evaluation of low back pain upon referral from Dr. Amber Ahn based on a MyChart request from the patient.  No other significant history noted.     The patient clarifies that for about a year without any particular incident, she started noticing lumbosacral midline pain worsening as noted below.  Sometimes she will get an achiness in both buttocks and in the proximal posterior bilateral thighs most of the time that is not there.  There is no true numbness tingling weakness or pain in her legs.  There is no bowel or bladder dysfunction and no saddle anesthesia.  She is going through menopause and initially she thought that this might be related to that because during her periods the pain would be worse but now it is lasting longer so she thinks it might be unrelated to that.  She stopped doing Pilates and dance because that would aggravate her pain and then she would notice a sharp pain when she would transition from sitting to standing.    INTERIM HISTORY:    Mary Carmen has been through therapy with Ramesh and they have stabilize the pelvis and  done a bunch of other things and actually her pain is worse now than before.  Remains midline with no radiating leg pain numbness tingling or weakness bowel or bladder dysfunction or saddle anesthesia.  She has tried resuming activities but the back pain is still limiting.    Reviewed past medical, surgical, and family history.               Pertinent for depression     SOCIAL HX: She is a self-employed author/ and writes romance novels.  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  Sports hobbies and activities she used to do Pilates and dance but stopped that.  Binge watching  romance TV series.       Objective:    IMAGING:  No spinal imaging      EXAMINATION:    CONSTITUTIONAL:  Vital signs as above.  No acute distress.  The patient is well nourished and well groomed.  She transitions well and moves fluidly  PSYCHIATRIC:  The patient is awake, alert, oriented to person, place and time.  The patient is answering questions appropriately with clear speech.  Normal affect.  Able to follow commands  MUSCULOSKELETAL:  Gait is non-antalgic.  The patient is able to heel and toe walk without any difficulty.   Squat and rise normal.   .  Back ROM: Full with some pain on flexion limited by 50% in extension with midline pain and it worsens more with combined right than left sidebending and rotation..     NEUROLOGICAL:    Motor:  L3-S1 myotomes 5/5     Sensation to light touch is intact in the bilateral lower extremities.    SLR negative    Pelvis: No recurrent Pelvic Joint Dysfunction.  Standing flexion, Alok sign, and stork test negative.  Minimal discomfort noted with bilateral SI joint spring testing but not lumbar.  No lumbar or gluteal muscle spasm, trigger points, or tender    Assessment     Ashly Tan  is a 53 year old y.o. female who presents today for follow-up regarding     Resolved Pelvic Joint Dysfunction-no improvement in symptoms    I suspect right more than left lower facet mediated  pain      Plan:  Lumbar x-ray and MRI and return to clinic to review the images and determine next steps.  I am inclined to proceed with an RFA work-up on the lower right lumbar segments.    Advised patient to call or return early if symptoms worsen, or having problems controlling bladder and bowel function or worsening leg weakness.     Please note: Voice recognition software was used in this dictation.  It may therefore contain typographical errors.  Mark Pop MD      Again, thank you for allowing me to participate in the care of your patient.        Sincerely,        Mark Pop MD

## 2023-03-29 NOTE — PROGRESS NOTES
Subjective:  Ashly Tan is a 53 year old female who presents today for follow-up regarding     Resolved Pelvic Joint Dysfunction-no improvement in symptoms    I suspect right more than left lower facet mediated pain  Lumbar x-rays and lumbar MRI  with follow-up.  Consider RFA low lumbar right    PRIOR HISTORY from 12/29/2022:  She was seen for evaluation of low back pain upon referral from Dr. Amber Ahn based on a MyChart request from the patient.  No other significant history noted.     The patient clarifies that for about a year without any particular incident, she started noticing lumbosacral midline pain worsening as noted below.  Sometimes she will get an achiness in both buttocks and in the proximal posterior bilateral thighs most of the time that is not there.  There is no true numbness tingling weakness or pain in her legs.  There is no bowel or bladder dysfunction and no saddle anesthesia.  She is going through menopause and initially she thought that this might be related to that because during her periods the pain would be worse but now it is lasting longer so she thinks it might be unrelated to that.  She stopped doing Pilates and dance because that would aggravate her pain and then she would notice a sharp pain when she would transition from sitting to standing.     Updated 3/29/2023 HISTORY:    Mary Carmen has been through therapy with Ramesh and they have stabilizeD the pelvis and done a bunch of other things and actually her pain is worse now than before.  Remains midline with no radiating leg pain numbness tingling or weakness bowel or bladder dysfunction or saddle anesthesia.  She has tried resuming activities but the back pain is still limiting.    INTERIM HISTORY:      Lumbar MRI 3/30/2023: Significant DDD L1-L5 with stepwise retrolisthesis L2-4.  L4-5, moderate to severe central stenosis and mild to moderate bilateral foraminal stenosis    Lumbar x-ray 3/29/2022 shows multilevel lumbar  retrolisthesis with significant degenerative disc disease but no significant degenerative joint disease.  Slight right lateral listhesis of L2 on 3.    Mary Carmen confirms that her back pain is progressively been worsening for 5 to 10 years but in the last year it has not recovered in the timeframe she is used to and it has been an impediment to her quality of life so she has had to stop her Pilates and dance.  She confirms no leg numbness tingling or weakness bowel or bladder dysfunction.    Reviewed past medical, surgical, and family history.               Pertinent for depression     SOCIAL HX: She is a self-employed author/ and writes romance novels.  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  Sports hobbies and activities she used to do Pilates and dance but stopped that.  Binge watching  romance TV series.  Objective:    IMAGING: Images and report reviewed.    MR Lumbar spine without contrast 3/30/2023       Subtle, stepwise posterior listhesis of L2-L4.  There is moderate disc height  narrowing at L1/L2 and L2/L3..  Reactive endplate changes throughout L1-L4.     On a level by level basis:     T12-L1: Mild uncinate and facet arthropathy. Small disc protrusion  centered on the left subarticular zone. No spinal or neuroforaminal  stenosis.      L1-2: Uncinate and facet arthropathy with a disc protrusion centered  on the left foraminal zone.  No spinal stenosis or neural foraminal  stenosis.     L2-3: Mild facet arthropathy with an asymmetrical posterior disc  bulge. No spinal or neural foraminal stenosis.     L3-4: Symmetric posterior disc bulge with mild bilateral facet and  flavum hypertrophy. There is mild bilateral neural foraminal stenosis,  left greater than right. No spinal stenosis.     L4-5: Moderate degree of bilateral facet arthropathy with a slightly  asymmetric posterior disc bulge. There is mild to moderate bilateral  neural foraminal stenosis and moderate spinal stenosis.     L5-S1: Mild facet  arthropathy with an asymmetric posterior disc bulge.  No stone or foraminal stenosis.                                                                 Impression:      Multiple level degenerative changes throughout the lumbar spine, most  prominently at the level of L4/L5 with mild to moderate bilateral  neural foraminal and moderate spinal stenosis.     XR LUMBAR SPINE 2/3 VIEWS: 3/29/2023  :   5 lumbar-type vertebral bodies. Retrolisthesis of L1 on L2 measuring 2 mm and L2 on L3 measuring 4 mm. Retrolisthesis of L3 on L4 measuring 5 mm and L4 and L5 measuring 4 mm. The vertebral bodies of the lumbar spine otherwise have normal stature and alignment without evidence of compression fracture. Anterior marginal osteophytes at L1/L2 - L4/L5. Multilevel degenerative disc disease of the lumbar spine with disc height loss, most pronounced at L1/L2 - L4/L5. Soft tissues otherwise unremarkable.  (OM-also slight right lateral listhesis L2 on 3)    XR LUMBAR BENDING ONLY 2/3 VIEWS4/4/2023                                                                  IMPRESSION: There are 5 lumbar type vertebral bodies. There is 9 mm of grade 1 bordering on grade 2 retrolisthesis of L3 on L4 with extension positioning, which decreases to 5 mm with flexion positioning. There is 6 mm of grade 1 retrolisthesis of L4 on L5 with extension positioning, which decreases to 4 mm with flexion positioning. There is 2 mm of grade 1 retrolisthesis of L2 on L3 with extension positioning, which reduces to normal with flexion positioning. The vertebral body heights are maintained. Moderate multilevel degenerative change appears similar to the prior study.     EXAMINATION:    No formal exam today.    Assessment     Ashly Tan  is a 53 year old y.o. female who presents today for follow-up regarding     Chronic low back pain    Resolved Pelvic Joint Dysfunction with worsening of symptoms through PT    No radicular signs or symptoms and no cauda equina  signs or symptoms    Multilevel (L1-L5) DDD with stepwise retrolisthesis L2-4, and right lateral listhesis L2 on 3.    Instability at L2-3, L3-4 and L4-5 on dynamic imaging      Plan:  Resume Pilates for core strengthening and doing MedX strengthening program.  Follow-up in 6 months.  We talked about Basivertebral nerve RFA as possibly the neck step, possibly followed by facet RFA work-up, and at any point if she wants to talk to a spine surgeon about multilevel fusions be happy to send her over but at this point we would both like to avoid that.    Advised patient to call or return early if symptoms worsen, or having problems controlling bladder and bowel function or worsening leg weakness.     Please note: Voice recognition software was used in this dictation.  It may therefore contain typographical errors.  Mark Pop MD

## 2023-03-30 ENCOUNTER — ANCILLARY PROCEDURE (OUTPATIENT)
Dept: MRI IMAGING | Facility: CLINIC | Age: 53
End: 2023-03-30
Attending: PREVENTIVE MEDICINE
Payer: COMMERCIAL

## 2023-03-30 DIAGNOSIS — G89.29 CHRONIC MIDLINE LOW BACK PAIN WITHOUT SCIATICA: ICD-10-CM

## 2023-03-30 DIAGNOSIS — M54.50 CHRONIC MIDLINE LOW BACK PAIN WITHOUT SCIATICA: ICD-10-CM

## 2023-03-30 PROCEDURE — 72148 MRI LUMBAR SPINE W/O DYE: CPT | Mod: GC | Performed by: RADIOLOGY

## 2023-04-04 ENCOUNTER — OFFICE VISIT (OUTPATIENT)
Dept: NEUROSURGERY | Facility: CLINIC | Age: 53
End: 2023-04-04
Payer: COMMERCIAL

## 2023-04-04 ENCOUNTER — ANCILLARY PROCEDURE (OUTPATIENT)
Dept: GENERAL RADIOLOGY | Facility: CLINIC | Age: 53
End: 2023-04-04
Attending: PREVENTIVE MEDICINE
Payer: COMMERCIAL

## 2023-04-04 VITALS
WEIGHT: 180 LBS | DIASTOLIC BLOOD PRESSURE: 80 MMHG | SYSTOLIC BLOOD PRESSURE: 118 MMHG | BODY MASS INDEX: 26.97 KG/M2 | HEART RATE: 80 BPM

## 2023-04-04 DIAGNOSIS — R29.898 MUSCULAR DECONDITIONING: ICD-10-CM

## 2023-04-04 DIAGNOSIS — M54.50 CHRONIC MIDLINE LOW BACK PAIN WITHOUT SCIATICA: Primary | ICD-10-CM

## 2023-04-04 DIAGNOSIS — M54.50 CHRONIC MIDLINE LOW BACK PAIN WITHOUT SCIATICA: ICD-10-CM

## 2023-04-04 DIAGNOSIS — G89.29 CHRONIC MIDLINE LOW BACK PAIN WITHOUT SCIATICA: Primary | ICD-10-CM

## 2023-04-04 DIAGNOSIS — M53.2X6 LUMBAR SPINE INSTABILITY: ICD-10-CM

## 2023-04-04 DIAGNOSIS — G89.29 CHRONIC MIDLINE LOW BACK PAIN WITHOUT SCIATICA: ICD-10-CM

## 2023-04-04 PROCEDURE — 99214 OFFICE O/P EST MOD 30 MIN: CPT | Performed by: PREVENTIVE MEDICINE

## 2023-04-04 PROCEDURE — 72120 X-RAY BEND ONLY L-S SPINE: CPT | Performed by: RADIOLOGY

## 2023-04-04 ASSESSMENT — PAIN SCALES - GENERAL: PAINLEVEL: MILD PAIN (3)

## 2023-04-04 NOTE — PATIENT INSTRUCTIONS
Mary Carmen I think the problem is coming from unstable segments and pain associated with those discs and or bony endplates.  Initially I thought it was from the facet joints in the back of the spine but I am less convinced of that because those joints actually look pretty good.  Lets see how you do with a strengthening program and I am looking forward to seeing you back in 6 months.  See the assessment and plan below for further details of our discussion today.    Assessment     Ashly Tan  is a 53 year old y.o. female who presents today for follow-up regarding   Chronic low back pain  Resolved Pelvic Joint Dysfunction with worsening of symptoms through PT  No radicular signs or symptoms and no cauda equina signs or symptoms  Multilevel (L1-L5) DDD with stepwise retrolisthesis L2-4, and right lateral listhesis L2 on 3.  Minor instability at L2-3 and L3-4 on dynamic imaging      Plan:  Resume Pilates for core strengthening and doing MedX strengthening program.  Follow-up in 6 months.  We talked about Basivertebral nerve RFA as possibly the neck step and at any point if she wants to talk to a spine surgeon about multilevel fusions be happy to send her over but at this point we would both like to avoid that.

## 2023-04-04 NOTE — NURSING NOTE
Reason For Visit: No chief complaint on file.        Occupation: Self-employed  Currently working? Yes.  Work status?  Part-time.     Sports: None  Activities: Previously dance and pilates       /80   Pulse 80   Wt 81.6 kg (180 lb)   BMI 26.97 kg/m        No Known Allergies    Current Outpatient Medications   Medication     CALCIUM PO     cephALEXin (KEFLEX) 500 MG capsule     citalopram (CELEXA) 40 MG tablet     Ferrous Sulfate (IRON SUPPLEMENT PO)     ibuprofen (ADVIL/MOTRIN) 200 MG tablet     Magnesium Chloride (MAGNESIUM DR PO)     Pseudoephedrine-Ibuprofen (ADVIL COLD/SINUS PO)     No current facility-administered medications for this visit.         Darla Severin-Brown, LPN

## 2023-04-04 NOTE — LETTER
4/4/2023         RE: Ashly Tan  [address]  [place] [address]        Dear Colleague,    Thank you for referring your patient, Ashly Tan, to the [facility]OS[facility] [place]. Please see a copy of my visit note below.      Subjective:  Ashly Tan is a 53 year old female who presents today for follow-up regarding   Resolved Pelvic Joint Dysfunction-no improvement in symptoms  I suspect right more than left lower facet mediated pain  Lumbar x-rays and lumbar MRI  with follow-up.  Consider RFA low lumbar right    PRIOR HISTORY from 12/29/2022:  She was seen for evaluation of low back pain upon referral from Dr. Amber Ahn based on a MyChart request from the patient.  No other significant history noted.     The patient clarifies that for about a year without any particular incident, she started noticing lumbosacral midline pain worsening as noted below.  Sometimes she will get an achiness in both buttocks and in the proximal posterior bilateral thighs most of the time that is not there.  There is no true numbness tingling weakness or pain in her legs.  There is no bowel or bladder dysfunction and no saddle anesthesia.  She is going through menopause and initially she thought that this might be related to that because during her periods the pain would be worse but now it is lasting longer so she thinks it might be unrelated to that.  She stopped doing Pilates and dance because that would aggravate her pain and then she would notice a sharp pain when she would transition from sitting to standing.     Updated 3/29/2023 HISTORY:    Mary Carmen has been through therapy with Ramesh and they have stabilizeD the pelvis and done a bunch of other things and actually her pain is worse now than before.  Remains midline with no radiating leg pain numbness tingling or weakness bowel or bladder dysfunction or saddle anesthesia.  She has tried resuming activities but the back pain is  still limiting.    INTERIM HISTORY:      Lumbar MRI 3/30/2023: Significant DDD L1-L5 with stepwise retrolisthesis L2-4.  L4-5, moderate to severe central stenosis and mild to moderate bilateral foraminal stenosis    Lumbar x-ray 3/29/2022 shows multilevel lumbar retrolisthesis with significant degenerative disc disease but no significant degenerative joint disease.  Slight right lateral listhesis of L2 on 3.    Mary Carmen confirms that her back pain is progressively been worsening for 5 to 10 years but in the last year it has not recovered in the timeframe she is used to and it has been an impediment to her quality of life so she has had to stop her Pilates and dance.  She confirms no leg numbness tingling or weakness bowel or bladder dysfunction.    Reviewed past medical, surgical, and family history.               Pertinent for depression     SOCIAL HX: She is a self-employed author/ and writes romance novels.  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  Sports hobbies and activities she used to do Pilates and dance but stopped that.  Binge watching  romance TV series.  Objective:    IMAGING: Images and report reviewed.    MR Lumbar spine without contrast 3/30/2023       Subtle, stepwise posterior listhesis of L2-L4.  There is moderate disc height  narrowing at L1/L2 and L2/L3..  Reactive endplate changes throughout L1-L4.     On a level by level basis:     T12-L1: Mild uncinate and facet arthropathy. Small disc protrusion  centered on the left subarticular zone. No spinal or neuroforaminal  stenosis.      L1-2: Uncinate and facet arthropathy with a disc protrusion centered  on the left foraminal zone.  No spinal stenosis or neural foraminal  stenosis.     L2-3: Mild facet arthropathy with an asymmetrical posterior disc  bulge. No spinal or neural foraminal stenosis.     L3-4: Symmetric posterior disc bulge with mild bilateral facet and  flavum hypertrophy. There is mild bilateral neural foraminal stenosis,  left  greater than right. No spinal stenosis.     L4-5: Moderate degree of bilateral facet arthropathy with a slightly  asymmetric posterior disc bulge. There is mild to moderate bilateral  neural foraminal stenosis and moderate spinal stenosis.     L5-S1: Mild facet arthropathy with an asymmetric posterior disc bulge.  No stone or foraminal stenosis.                                                                 Impression:      Multiple level degenerative changes throughout the lumbar spine, most  prominently at the level of L4/L5 with mild to moderate bilateral  neural foraminal and moderate spinal stenosis.     XR LUMBAR SPINE 2/3 VIEWS: 3/29/2023  :   5 lumbar-type vertebral bodies. Retrolisthesis of L1 on L2 measuring 2 mm and L2 on L3 measuring 4 mm. Retrolisthesis of L3 on L4 measuring 5 mm and L4 and L5 measuring 4 mm. The vertebral bodies of the lumbar spine otherwise have normal stature and alignment without evidence of compression fracture. Anterior marginal osteophytes at L1/L2 - L4/L5. Multilevel degenerative disc disease of the lumbar spine with disc height loss, most pronounced at L1/L2 - L4/L5. Soft tissues otherwise unremarkable.  (OM-also slight right lateral listhesis L2 on 3)    XR LUMBAR BENDING ONLY 2/3 VIEWS4/4/2023                                                                  IMPRESSION: There are 5 lumbar type vertebral bodies. There is 9 mm of grade 1 bordering on grade 2 retrolisthesis of L3 on L4 with extension positioning, which decreases to 5 mm with flexion positioning. There is 6 mm of grade 1 retrolisthesis of L4 on L5 with extension positioning, which decreases to 4 mm with flexion positioning. There is 2 mm of grade 1 retrolisthesis of L2 on L3 with extension positioning, which reduces to normal with flexion positioning. The vertebral body heights are maintained. Moderate multilevel degenerative change appears similar to the prior study.     EXAMINATION:    No formal exam  today.    Assessment     Ashly Tan  is a 53 year old y.o. female who presents today for follow-up regarding   Chronic low back pain  Resolved Pelvic Joint Dysfunction with worsening of symptoms through PT  No radicular signs or symptoms and no cauda equina signs or symptoms  Multilevel (L1-L5) DDD with stepwise retrolisthesis L2-4, and right lateral listhesis L2 on 3.  Instability at L2-3, L3-4 and L4-5 on dynamic imaging      Plan:  Resume Pilates for core strengthening and doing MedX strengthening program.  Follow-up in 6 months.  We talked about Basivertebral nerve RFA as possibly the neck step, possibly followed by facet RFA work-up, and at any point if she wants to talk to a spine surgeon about multilevel fusions be happy to send her over but at this point we would both like to avoid that.    Advised patient to call or return early if symptoms worsen, or having problems controlling bladder and bowel function or worsening leg weakness.     Please note: Voice recognition software was used in this dictation.  It may therefore contain typographical errors.  Mark Pop MD      Again, thank you for allowing me to participate in the care of your patient.        Sincerely,        Mark Pop MD

## 2023-04-27 ENCOUNTER — ANCILLARY PROCEDURE (OUTPATIENT)
Dept: MAMMOGRAPHY | Facility: CLINIC | Age: 53
End: 2023-04-27
Attending: FAMILY MEDICINE
Payer: COMMERCIAL

## 2023-04-27 DIAGNOSIS — Z12.31 VISIT FOR SCREENING MAMMOGRAM: ICD-10-CM

## 2023-04-27 PROCEDURE — 77067 SCR MAMMO BI INCL CAD: CPT | Performed by: RADIOLOGY

## 2023-04-27 PROCEDURE — 77063 BREAST TOMOSYNTHESIS BI: CPT | Performed by: RADIOLOGY

## 2023-05-16 NOTE — ADDENDUM NOTE
Addended by: KEELY CHRISTIANSON on: 9/8/2022 04:10 PM     Modules accepted: Orders     32 y.o.  @39w4d presenting in labor, patient now more effaced on exam.     - c/w expectant management   - Anticipate      Leticia Kern PGY-3

## 2023-06-14 NOTE — PROGRESS NOTES
[facility] Dermatology Note  Encounter Date: Rohit 15, 2023  Office Visit     Dermatology Problem List:  1. NMSC  - L thigh, BCC, s/p excision 10/25/22  - FBSE 12/15/22  ____________________________________________    Assessment & Plan:    # History of NMSC. No evidence of recurrent disease.  - Continue photoprotection - recommend SPF 30 or higher with frequent reapplication  - Continue Q6m skin exams  - Advised to monitor for changing, non-healing, bleeding, painful, or otherwise symptomatic lesions     # Benign lesions: Multiple benign nevi, solar lentigos, seborrheic keratoses, cherry angiomas. Explained to patient benign nature of lesion. No treatment is necessary at this time unless the lesion changes or becomes symptomatic.   - ABCDs of melanoma were discussed and self skin checks were advised.  - Sun precaution was advised including the use of sun screens of SPF 30 or higher, sun protective clothing, and avoidance of tanning beds.    # {Diagnosesderm:897182}.   {kkplans:575913}   - ***     Procedures Performed:   {kkprocedurenotes:418466}  {kkproceduremedstudent:537284}    Follow-up: {kkfollowup:523484}    Staff and ***:     ***  Patient seen and evaluated with attending physician.     ***  ____________________________________________    CC: No chief complaint on file.    HPI:  Ms. Ashly Tan is a(n) 53 year old female with histroy of NMSC who presents today as a return patient for FBSE. Patient last seen 12/15/22 for FBSE which was unremarkable. Today, ***    Patient is otherwise feeling well, without additional skin concerns.    Labs:  {kkreviewedlabs:962607} reviewed.    Physical Exam:  Vitals: There were no vitals taken for this visit.  SKIN: {kkSkinExam:685410}  - ***  - R breast with slightly irregularly bordered brown nevi with regular pigment pattern on dermoscopy  - There are waxy stuck on tan to brown papules on the back and stomach.   - Well healing scar on the L lateral  proximal thigh.  - There are dome shaped bright red papules on the stomach.  - Regularly shaped medium brown macules on legs, face, trunk and arms  - {Skin Exam Derm:359716}.   - {Skin Exam Derm:273160}.   - No other lesions of concern on areas examined.     Medications:  Current Outpatient Medications   Medication     CALCIUM PO     cephALEXin (KEFLEX) 500 MG capsule     citalopram (CELEXA) 40 MG tablet     Ferrous Sulfate (IRON SUPPLEMENT PO)     ibuprofen (ADVIL/MOTRIN) 200 MG tablet     Magnesium Chloride (MAGNESIUM DR PO)     Pseudoephedrine-Ibuprofen (ADVIL COLD/SINUS PO)     No current facility-administered medications for this visit.      Past Medical History:   Patient Active Problem List   Diagnosis     Allergic rhinitis     CARDIOVASCULAR SCREENING; LDL GOAL LESS THAN 160     Major depression in complete remission (H)     Recurrent cystitis     Basal cell carcinoma (BCC) of skin of left lower extremity including hip     Chronic bilateral low back pain without sciatica     Past Medical History:   Diagnosis Date     Allergic rhinitis     vs Mechanical Rhinitis     Basal cell carcinoma (BCC) of skin of left lower extremity including hip 9/8/2022     Depressive disorder 9/27/2011     History of asthma     Occassional     Major depression in complete remission (H) 9/27/2011     Uncomplicated asthma several years ago    Excercise induced        CC Referred Self, MD  No address on file on close of this encounter.

## 2023-06-15 ENCOUNTER — OFFICE VISIT (OUTPATIENT)
Dept: DERMATOLOGY | Facility: CLINIC | Age: 53
End: 2023-06-15
Payer: COMMERCIAL

## 2023-06-15 DIAGNOSIS — L82.1 SEBORRHEIC KERATOSES: ICD-10-CM

## 2023-06-15 DIAGNOSIS — L57.8 ACTINIC SKIN DAMAGE: Primary | ICD-10-CM

## 2023-06-15 DIAGNOSIS — Z85.828 HISTORY OF BASAL CELL CARCINOMA: ICD-10-CM

## 2023-06-15 DIAGNOSIS — L81.4 LENTIGINES: ICD-10-CM

## 2023-06-15 PROCEDURE — 99213 OFFICE O/P EST LOW 20 MIN: CPT | Performed by: STUDENT IN AN ORGANIZED HEALTH CARE EDUCATION/TRAINING PROGRAM

## 2023-06-15 ASSESSMENT — PAIN SCALES - GENERAL: PAINLEVEL: NO PAIN (0)

## 2023-06-15 NOTE — PROGRESS NOTES
[facility] Dermatology Note    Encounter Date: Rohit 15, 2023    Dermatology Problem List:   NMSC  - L thigh, BCC, s/p excision 10/25/22    Major PMHx  -   ______________________________________    Impression/Plan:  Ashly was seen today for skin check.    Diagnoses and all orders for this visit:    Actinic skin damage  Lentigines  - discussed sunprotective behaviors, clothing, and the use of sunscreen    History of basal cell carcinoma  - L thigh  - No obvious evidence of recurrence at site of previous malignancy    Seborrheic keratoses  - benign          Follow-up in 6 mo .       Staff Involved:  Staff Only    Federico Murguia MD   of Dermatology  Department of Dermatology  [facility]      CC:   Chief Complaint   Patient presents with     Skin Check     Brittany Is here for a skin check and has no spots of concern.       History of Present Illness:  Ms. Ashly Tan is a 53 year old female who presents as a return patient.    Here for examination of spots on trunk and extremities     Labs:      Physical exam:  Vitals: There were no vitals taken for this visit.  GEN: well developed, well-nourished, in no acute distress, in a pleasant mood.     SKIN: Hebert phototype 1  - Full skin, which includes the head/face, both arms, chest, back, abdomen,both legs, genitalia and/or groin buttocks, digits and/or nails, was examined.  - No obvious evidence of recurrence at site of previous malignancy  - Flat brown macules and patches in a sun exposed areas on face and extremities  - Stuck on brown papules on trunk and extremities   - No other lesions of concern on areas examined.     Past Medical History:   Past Medical History:   Diagnosis Date     Allergic rhinitis     vs Mechanical Rhinitis     Basal cell carcinoma (BCC) of skin of left lower extremity including hip 9/8/2022     Depressive disorder 9/27/2011     History of asthma     Occassional     Major  depression in complete remission (H) 9/27/2011     Uncomplicated asthma several years ago    Excercise induced     Past Surgical History:   Procedure Laterality Date     APPENDECTOMY       COLONOSCOPY WITH CO2 INSUFFLATION N/A 9/20/2021    Procedure: COLONOSCOPY, WITH CO2 INSUFFLATION;  Surgeon: Jenaro Camargo DO;  Location: [facility] OR     NO HISTORY OF SURGERY         Social History:   reports that she has never smoked. She has never used smokeless tobacco. She reports current alcohol use. She reports that she does not use drugs.    Family History:  Family History   Problem Relation Age of Onset     Depression Mother      Anxiety Disorder Mother      Thyroid Disease Mother      Skin Cancer Father      Asthma Sister      Anxiety Disorder Sister      Asthma Sister      Thyroid Disease Sister      Thyroid Disease Maternal Grandmother      Circulatory Maternal Grandfather         stroke     Cerebrovascular Disease Maternal Grandfather      Diabetes Paternal Grandfather      Depression Daughter      Anxiety Disorder Daughter      Cancer Maternal Aunt         Ovarian     Melanoma No family hx of        Medications:  Current Outpatient Medications   Medication Sig Dispense Refill     CALCIUM PO        cephALEXin (KEFLEX) 500 MG capsule Take 1 capsule (500 mg) by mouth 3 times daily 30 capsule 5     citalopram (CELEXA) 40 MG tablet Take 1 tablet (40 mg) by mouth daily 90 tablet 3     Ferrous Sulfate (IRON SUPPLEMENT PO)        ibuprofen (ADVIL/MOTRIN) 200 MG tablet Take 200 mg by mouth every 4 hours as needed for mild pain       Magnesium Chloride (MAGNESIUM DR PO) Take by mouth daily       Pseudoephedrine-Ibuprofen (ADVIL COLD/SINUS PO)        No Known Allergies

## 2023-06-15 NOTE — NURSING NOTE
Dermatology Rooming Note    Ashly Tan's goals for this visit include:   Chief Complaint   Patient presents with     Skin Check     Brittany Is here for a skin check and has no spots of concern.     Jose Luis Powell, EMT

## 2023-06-15 NOTE — LETTER
6/15/2023       RE: Ashly Tan  [address]  [place] [address]     Dear Colleague,    Thank you for referring your patient, Ashly Tan, to the [facility] [place] at [facility]. Please see a copy of my visit note below.    [facility] Dermatology Note    Encounter Date: Rohit 15, 2023    Dermatology Problem List:   NMSC  - L thigh, BCC, s/p excision 10/25/22    Major PMHx  -   ______________________________________    Impression/Plan:  Ashly was seen today for skin check.    Diagnoses and all orders for this visit:    Actinic skin damage  Lentigines  - discussed sunprotective behaviors, clothing, and the use of sunscreen    History of basal cell carcinoma  - L thigh  - No obvious evidence of recurrence at site of previous malignancy    Seborrheic keratoses  - benign          Follow-up in 6 mo .       Staff Involved:  Staff Only    Federico Murguia MD   of Dermatology  Department of Dermatology  [facility]      CC:   Chief Complaint   Patient presents with    Skin Check     Brittany Is here for a skin check and has no spots of concern.       History of Present Illness:  Ms. Ashly Tan is a 53 year old female who presents as a return patient.    Here for examination of spots on trunk and extremities     Labs:      Physical exam:  Vitals: There were no vitals taken for this visit.  GEN: well developed, well-nourished, in no acute distress, in a pleasant mood.     SKIN: Hebert phototype 1  - Full skin, which includes the head/face, both arms, chest, back, abdomen,both legs, genitalia and/or groin buttocks, digits and/or nails, was examined.  - No obvious evidence of recurrence at site of previous malignancy  - Flat brown macules and patches in a sun exposed areas on face and extremities  - Stuck on brown papules on trunk and extremities   - No other  lesions of concern on areas examined.     Past Medical History:   Past Medical History:   Diagnosis Date    Allergic rhinitis     vs Mechanical Rhinitis    Basal cell carcinoma (BCC) of skin of left lower extremity including hip 9/8/2022    Depressive disorder 9/27/2011    History of asthma     Occassional    Major depression in complete remission (H) 9/27/2011    Uncomplicated asthma several years ago    Excercise induced     Past Surgical History:   Procedure Laterality Date    APPENDECTOMY      COLONOSCOPY WITH CO2 INSUFFLATION N/A 9/20/2021    Procedure: COLONOSCOPY, WITH CO2 INSUFFLATION;  Surgeon: Jenaro Camargo DO;  Location: [facility] OR    NO HISTORY OF SURGERY         Social History:   reports that she has never smoked. She has never used smokeless tobacco. She reports current alcohol use. She reports that she does not use drugs.    Family History:  Family History   Problem Relation Age of Onset    Depression Mother     Anxiety Disorder Mother     Thyroid Disease Mother     Skin Cancer Father     Asthma Sister     Anxiety Disorder Sister     Asthma Sister     Thyroid Disease Sister     Thyroid Disease Maternal Grandmother     Circulatory Maternal Grandfather         stroke    Cerebrovascular Disease Maternal Grandfather     Diabetes Paternal Grandfather     Depression Daughter     Anxiety Disorder Daughter     Cancer Maternal Aunt         Ovarian    Melanoma No family hx of        Medications:  Current Outpatient Medications   Medication Sig Dispense Refill    CALCIUM PO       cephALEXin (KEFLEX) 500 MG capsule Take 1 capsule (500 mg) by mouth 3 times daily 30 capsule 5    citalopram (CELEXA) 40 MG tablet Take 1 tablet (40 mg) by mouth daily 90 tablet 3    Ferrous Sulfate (IRON SUPPLEMENT PO)       ibuprofen (ADVIL/MOTRIN) 200 MG tablet Take 200 mg by mouth every 4 hours as needed for mild pain      Magnesium Chloride (MAGNESIUM DR PO) Take by mouth daily      Pseudoephedrine-Ibuprofen (ADVIL COLD/SINUS PO)         No Known Allergies

## 2023-06-26 ENCOUNTER — TRANSFERRED RECORDS (OUTPATIENT)
Dept: HEALTH INFORMATION MANAGEMENT | Facility: CLINIC | Age: 53
End: 2023-06-26
Payer: COMMERCIAL

## 2023-08-07 ENCOUNTER — TRANSFERRED RECORDS (OUTPATIENT)
Dept: HEALTH INFORMATION MANAGEMENT | Facility: CLINIC | Age: 53
End: 2023-08-07

## 2023-08-11 ENCOUNTER — TRANSFERRED RECORDS (OUTPATIENT)
Dept: HEALTH INFORMATION MANAGEMENT | Facility: CLINIC | Age: 53
End: 2023-08-11
Payer: COMMERCIAL

## 2023-08-14 ENCOUNTER — PATIENT OUTREACH (OUTPATIENT)
Dept: CARE COORDINATION | Facility: CLINIC | Age: 53
End: 2023-08-14
Payer: COMMERCIAL

## 2023-08-28 ENCOUNTER — PATIENT OUTREACH (OUTPATIENT)
Dept: CARE COORDINATION | Facility: CLINIC | Age: 53
End: 2023-08-28
Payer: COMMERCIAL

## 2023-09-11 ASSESSMENT — PATIENT HEALTH QUESTIONNAIRE - PHQ9: SUM OF ALL RESPONSES TO PHQ QUESTIONS 1-9: 0

## 2023-09-12 ENCOUNTER — OFFICE VISIT (OUTPATIENT)
Dept: FAMILY MEDICINE | Facility: CLINIC | Age: 53
End: 2023-09-12
Payer: COMMERCIAL

## 2023-09-12 VITALS
HEART RATE: 73 BPM | DIASTOLIC BLOOD PRESSURE: 80 MMHG | TEMPERATURE: 98.3 F | SYSTOLIC BLOOD PRESSURE: 115 MMHG | RESPIRATION RATE: 18 BRPM | BODY MASS INDEX: 28.49 KG/M2 | WEIGHT: 188 LBS | OXYGEN SATURATION: 98 % | HEIGHT: 68 IN

## 2023-09-12 DIAGNOSIS — N30.90 RECURRENT CYSTITIS: ICD-10-CM

## 2023-09-12 DIAGNOSIS — F33.0 MILD EPISODE OF RECURRENT MAJOR DEPRESSIVE DISORDER (H): ICD-10-CM

## 2023-09-12 DIAGNOSIS — Z00.00 ROUTINE GENERAL MEDICAL EXAMINATION AT A HEALTH CARE FACILITY: Primary | ICD-10-CM

## 2023-09-12 PROCEDURE — 99396 PREV VISIT EST AGE 40-64: CPT | Performed by: FAMILY MEDICINE

## 2023-09-12 RX ORDER — CEPHALEXIN 500 MG/1
500 CAPSULE ORAL 3 TIMES DAILY
Qty: 30 CAPSULE | Refills: 5 | Status: SHIPPED | OUTPATIENT
Start: 2023-09-12

## 2023-09-12 RX ORDER — CITALOPRAM HYDROBROMIDE 40 MG/1
40 TABLET ORAL DAILY
Qty: 90 TABLET | Refills: 3 | Status: SHIPPED | OUTPATIENT
Start: 2023-09-12

## 2023-09-12 ASSESSMENT — PATIENT HEALTH QUESTIONNAIRE - PHQ9: SUM OF ALL RESPONSES TO PHQ QUESTIONS 1-9: 0

## 2023-09-12 ASSESSMENT — PAIN SCALES - GENERAL: PAINLEVEL: MILD PAIN (3)

## 2023-09-12 NOTE — PROGRESS NOTES
SUBJECTIVE:   CC: Brittany is an 53 year old who presents for preventive health visit.       2023     8:30 AM   Additional Questions   Roomed by Haleigh COLLAZO   Accompanied by Self         2023     8:30 AM   Patient Reported Additional Medications   Patient reports taking the following new medications None       History of Present Illness       Reason for visit:  Annual visit    She eats 2-3 servings of fruits and vegetables daily.She consumes 0 sweetened beverage(s) daily.She exercises with enough effort to increase her heart rate 30 to 60 minutes per day.  She exercises with enough effort to increase her heart rate 3 or less days per week.   She is taking medications regularly.      Today's PHQ-9 Score:       2023     2:22 PM   PHQ-9 SCORE   PHQ-9 Total Score MyChart 0   PHQ-9 Total Score 0           Social History     Tobacco Use    Smoking status: Never     Passive exposure: Never    Smokeless tobacco: Never   Substance Use Topics    Alcohol use: Yes     Comment: 3-4 per week             2022     2:05 PM   Alcohol Use   Prescreen: >3 drinks/day or >7 drinks/week? No          No data to display              Reviewed orders with patient.  Reviewed health maintenance and updated orders accordingly - Yes  Lab work is in process  Labs reviewed in EPIC  BP Readings from Last 3 Encounters:   23 115/80   23 118/80   23 124/81    Wt Readings from Last 3 Encounters:   23 85.3 kg (188 lb)   23 81.6 kg (180 lb)   23 81.6 kg (180 lb)                    Breast Cancer Screenin/3/2021     8:16 AM 2022     2:06 PM   Breast CA Risk Assessment (FHS-7)   Do you have a family history of breast, colon, or ovarian cancer? Yes No / Unknown           Pertinent mammograms are reviewed under the imaging tab.    History of abnormal Pap smear: NO - age 30-65 PAP every 5 years with negative HPV co-testing recommended      Latest Ref Rng & Units 2022     8:04 AM 3/13/2017  "Here is the plan from today's visit    1. Gender dysphoria in adult  Refilled. Work on getting 2 letter of support from therapists for your hysterectomy.   - testosterone cypionate (DEPOTESTOSTERONE) 200 MG/ML injection; Inject 0.3 mLs (60 mg) into the muscle once a week  Dispense: 4 mL; Refill: 4  - Syringe, Disposable, (SYRINGE LUER LOCK) 3 ML MISC; 1 applicator every 7 days  Dispense: 100 each; Refill: 0  - Needle, Disp, 23G X 1\" MISC; 1 applicator every 7 days  Dispense: 100 each; Refill: 0  - needle, disp, 18G X 1\" MISC; 1 applicator every 7 days  Dispense: 100 each; Refill: 0    2. Current moderate episode of major depressive disorder, unspecified whether recurrent (H)  Keep going to therapy. Happy to have telephone call sooner if needed.   - FLUoxetine 20 MG tablet; Take 1.5 tablets (30 mg) by mouth daily  Dispense: 30 tablet; Refill: 4    3. IUD (intrauterine device) in place      Please call or return to clinic if your symptoms don't go away.    Thank you for coming to [facility] today.  Lab Testing:  **If you had lab testing today and your results are reassuring or normal they will be mailed to you or sent through [employer] within 7 days.   **If the lab tests need quick action we will call you with the results.  The phone number we will call with results is # [phone] (home) . If this is not the best number please call our clinic and change the number.  Medication Refills:  If you need any refills please call your pharmacy and they will contact us.   If you need to  your refill at a new pharmacy, please contact the new pharmacy directly. The new pharmacy will help you get your medications transferred faster.   Scheduling:  If you have any concerns about today's visit or wish to schedule another appointment please call our office during normal business hours [phone] (8-5:00 M-F)  If a referral was made to a [facility] and you don't get a call from [place] " "    2:52 PM 3/13/2017     2:13 PM   PAP / HPV   PAP  Negative for Intraepithelial Lesion or Malignancy (NILM)      PAP (Historical)    NIL    HPV 16 DNA Negative Negative  Negative     HPV 18 DNA Negative Negative  Negative     Other HR HPV Negative Negative  Negative       Reviewed and updated as needed this visit by clinical staff   Tobacco  Allergies  Meds  Problems  Med Hx  Surg Hx  Fam Hx          Reviewed and updated as needed this visit by Provider   Tobacco  Allergies  Meds  Problems  Med Hx  Surg Hx  Fam Hx         Here today for routine checkup and follow-up on stable issues  Doing well.  Reports no interval health concerns.      Review of Systems  CONSTITUTIONAL: NEGATIVE for fever, chills, change in weight  INTEGUMENTARU/SKIN: NEGATIVE for worrisome rashes, moles or lesions  EYES: NEGATIVE for vision changes or irritation  ENT: NEGATIVE for ear, mouth and throat problems  RESP: NEGATIVE for significant cough or SOB  BREAST: NEGATIVE for masses, tenderness or discharge  CV: NEGATIVE for chest pain, palpitations or peripheral edema  GI: NEGATIVE for nausea, abdominal pain, heartburn, or change in bowel habits  : NEGATIVE for unusual urinary or vaginal symptoms. Periods are regular.  MUSCULOSKELETAL: NEGATIVE for significant arthralgias or myalgia  NEURO: NEGATIVE for weakness, dizziness or paresthesias  PSYCHIATRIC: NEGATIVE for changes in mood or affect     OBJECTIVE:   /80 (BP Location: Right arm, Patient Position: Sitting, Cuff Size: Adult Large)   Pulse 73   Temp 98.3  F (36.8  C) (Oral)   Resp 18   Ht 1.727 m (5' 8\")   Wt 85.3 kg (188 lb)   LMP 09/07/2023   SpO2 98%   BMI 28.59 kg/m    Physical Exam  GENERAL: healthy, alert and no distress  EYES: Eyes grossly normal to inspection, PERRL and conjunctivae and sclerae normal  HENT: ear canals and TM's normal, nose and mouth without ulcers or lesions  NECK: no adenopathy, no asymmetry, masses, or scars and thyroid normal to " scheduling please call [phone].  If a Mammogram was ordered for you at [facility] call [phone] to schedule or change your appointment.  If you had an XRay/CT/Ultrasound/MRI ordered the number is [phone] to schedule or change your radiology appointment.   Medical Concerns:  If you have urgent medical concerns please call [phone] at any time of the day.    Adrian Brewster MD     "palpation  RESP: lungs clear to auscultation - no rales, rhonchi or wheezes  CV: regular rate and rhythm, normal S1 S2, no S3 or S4, no murmur, click or rub, no peripheral edema and peripheral pulses strong  ABDOMEN: soft, nontender, no hepatosplenomegaly, no masses and bowel sounds normal  MS: no gross musculoskeletal defects noted, no edema  SKIN: no suspicious lesions or rashes  NEURO: Normal strength and tone, mentation intact and speech normal  PSYCH: mentation appears normal, affect normal/bright    Diagnostic Test Results:  Labs reviewed in Epic    ASSESSMENT/PLAN:   (Z00.00) Routine general medical examination at a health care facility  (primary encounter diagnosis)  Comment: Routine health maintenance otherwise up-to-date  Plan:     (F33.0) Mild episode of recurrent major depressive disorder (H)  Comment: Stable on current regimen.  Continue same plan and routine follow-up.   Plan: citalopram (CELEXA) 40 MG tablet            (N30.90) Recurrent cystitis  Comment: Stable on current regimen.  Continue same plan and routine follow-up.   Plan: cephALEXin (KEFLEX) 500 MG capsule            Patient has been advised of split billing requirements and indicates understanding: Yes      COUNSELING:  Reviewed preventive health counseling, as reflected in patient instructions       Regular exercise       Healthy diet/nutrition       Vision screening      BMI:   Estimated body mass index is 28.59 kg/m  as calculated from the following:    Height as of this encounter: 1.727 m (5' 8\").    Weight as of this encounter: 85.3 kg (188 lb).         She reports that she has never smoked. She has never been exposed to tobacco smoke. She has never used smokeless tobacco.          Amber Ahn MD  [facility]"

## 2023-10-06 ENCOUNTER — TRANSFERRED RECORDS (OUTPATIENT)
Dept: HEALTH INFORMATION MANAGEMENT | Facility: CLINIC | Age: 53
End: 2023-10-06
Payer: COMMERCIAL

## 2023-12-19 ENCOUNTER — OFFICE VISIT (OUTPATIENT)
Dept: DERMATOLOGY | Facility: CLINIC | Age: 53
End: 2023-12-19
Payer: COMMERCIAL

## 2023-12-19 DIAGNOSIS — L57.8 ACTINIC SKIN DAMAGE: ICD-10-CM

## 2023-12-19 DIAGNOSIS — Z85.828 HISTORY OF BASAL CELL CARCINOMA: Primary | ICD-10-CM

## 2023-12-19 DIAGNOSIS — L81.4 LENTIGINES: ICD-10-CM

## 2023-12-19 PROCEDURE — 99213 OFFICE O/P EST LOW 20 MIN: CPT | Mod: GC | Performed by: STUDENT IN AN ORGANIZED HEALTH CARE EDUCATION/TRAINING PROGRAM

## 2023-12-19 NOTE — PROGRESS NOTES
Dermatology Rooming Note    Ashly Tan's goals for this visit include:   Chief Complaint   Patient presents with    Derm Problem     JUAQUIN Hoang, EMT-B

## 2023-12-19 NOTE — LETTER
12/19/2023       RE: Ashly Tan  [address]  [place] [address]     Dear Colleague,    Thank you for referring your patient, Ashly Tan, to the [facility] [place] at [facility]. Please see a copy of my visit note below.    Dermatology Rooming Note    Ashly Tan's goals for this visit include:   Chief Complaint   Patient presents with    Derm Problem     FBSE     Solitario Hoang, EMT-B      [facility] Dermatology Note  Encounter Date: Dec 19, 2023  Office Visit     Dermatology Problem List:   NMSC  - L thigh, BCC, s/p excision 10/25/22    ____________________________________________    Assessment & Plan:     Actinic skin damage  Lentigines  - discussed sunprotective behaviors, clothing, and the use of sunscreen     History of basal cell carcinoma  - L thigh  - No obvious evidence of recurrence at site of previous malignancy  - ABCDEs: Counseled ABCDEs of melanoma: Asymmetry, Border (irregularity), Color (not uniform, changes in color), Diameter (greater than 6 mm which is about the size of a pencil eraser), and Evolving (any changes in preexisting moles).  - Monitor: Patient to continue monitoring at home and will contact the clinic for any changes.  - Sun protection: Counseled SPF30+ sunscreen, UPF clothing, sun avoidance, tanning bed avoidance.     Seborrheic keratoses  - benign      Procedures Performed:   None    Follow-up: one year for FBSE or sooner PRN    Staff: Dr. Blade Dawsonman  Internal Medicine PGY-1  ____________________________________________    CC: Derm Problem (FBSE)    HPI:  Ms. Ashly Tan is a(n) 53 year old female who presents today as a return patient for skin check. History of BCC 10/2022.  She denies any itching, ulcerated, nonhealing, or bleeding lesions. She denies any changing lesions or lesions of concern.  Patient is otherwise feeling well, without  additional skin concerns.    Labs Reviewed:  N/A    Physical Exam:  Vitals: There were no vitals taken for this visit.  GEN: well developed, well-nourished, in no acute distress, pleasant.     SKIN: Hebert phototype 1  - Full skin, which includes the head/face, both arms, chest, back, abdomen, both legs, genitalia and/or groin buttocks, digits and/or nails, was examined.  - No obvious evidence of recurrence at site of previous malignancy  - Flat brown macules and patches in a sun exposed areas on face and extremities  - Stuck on brown papules on trunk and extremities   - No other lesions of concern on areas examined.     Medications:  Current Outpatient Medications   Medication    CALCIUM PO    cephALEXin (KEFLEX) 500 MG capsule    citalopram (CELEXA) 40 MG tablet    Ferrous Sulfate (IRON SUPPLEMENT PO)    ibuprofen (ADVIL/MOTRIN) 200 MG tablet    Magnesium Chloride (MAGNESIUM DR PO)    Pseudoephedrine-Ibuprofen (ADVIL COLD/SINUS PO)     No current facility-administered medications for this visit.      Past Medical History:   Patient Active Problem List   Diagnosis    Allergic rhinitis    CARDIOVASCULAR SCREENING; LDL GOAL LESS THAN 160    Mild episode of recurrent major depressive disorder (H24)    Recurrent cystitis    Basal cell carcinoma (BCC) of skin of left lower extremity including hip    Chronic bilateral low back pain without sciatica     Past Medical History:   Diagnosis Date    Allergic rhinitis     vs Mechanical Rhinitis    Basal cell carcinoma (BCC) of skin of left lower extremity including hip 9/8/2022    Depressive disorder 9/27/2011    History of asthma     Occassional    Major depression in complete remission (H24) 9/27/2011    Uncomplicated asthma several years ago    Excercise induced       CC No referring provider defined for this encounter. on close of this encounter.

## 2023-12-19 NOTE — PROGRESS NOTES
[facility] Dermatology Note  Encounter Date: Dec 19, 2023  Office Visit     Dermatology Problem List:   NMSC  - L thigh, BCC, s/p excision 10/25/22    ____________________________________________    Assessment & Plan:     # ***.  {kkplans:860763}   - ***     # ***.   {kkplans:407636}   - ***     Procedures Performed:   {kkprocedurenotes:193164}  {kkprocedurenotes:624707}    Follow-up: {kkfollowup:275240}    {kkstaffinvolved:972616}    ***  ____________________________________________    CC: No chief complaint on file.    HPI:  Ms. Ashly Tan is a(n) 53 year old female who presents today as a return patient for skin check. History of BCC 10/2022.    Patient is otherwise feeling well, without additional skin concerns.    Labs Reviewed:  ***N/A    Physical Exam:  Vitals: There were no vitals taken for this visit.  SKIN: {kkSkinExam:824861}  - ***  - {Skin Exam Derm:248414}.   - {Skin Exam Derm:238537}.   - {Skin Exam Derm:531666}.   - No other lesions of concern on areas examined.     Medications:  Current Outpatient Medications   Medication    CALCIUM PO    cephALEXin (KEFLEX) 500 MG capsule    citalopram (CELEXA) 40 MG tablet    Ferrous Sulfate (IRON SUPPLEMENT PO)    ibuprofen (ADVIL/MOTRIN) 200 MG tablet    Magnesium Chloride (MAGNESIUM DR PO)    Pseudoephedrine-Ibuprofen (ADVIL COLD/SINUS PO)     No current facility-administered medications for this visit.      Past Medical History:   Patient Active Problem List   Diagnosis    Allergic rhinitis    CARDIOVASCULAR SCREENING; LDL GOAL LESS THAN 160    Mild episode of recurrent major depressive disorder (H24)    Recurrent cystitis    Basal cell carcinoma (BCC) of skin of left lower extremity including hip    Chronic bilateral low back pain without sciatica     Past Medical History:   Diagnosis Date    Allergic rhinitis     vs Mechanical Rhinitis    Basal cell carcinoma (BCC) of skin of left lower extremity including hip 9/8/2022    Depressive  disorder 9/27/2011    History of asthma     Occassional    Major depression in complete remission (H) 9/27/2011    Uncomplicated asthma several years ago    Excercise induced       CC No referring provider defined for this encounter. on close of this encounter.

## 2023-12-19 NOTE — PROGRESS NOTES
[facility] Dermatology Note  Encounter Date: Dec 19, 2023  Office Visit     Dermatology Problem List:   NMSC  - L thigh, BCC, s/p excision 10/25/22    ____________________________________________    Assessment & Plan:     Actinic skin damage  Lentigines  - discussed sunprotective behaviors, clothing, and the use of sunscreen     History of basal cell carcinoma  - L thigh  - No obvious evidence of recurrence at site of previous malignancy  - ABCDEs: Counseled ABCDEs of melanoma: Asymmetry, Border (irregularity), Color (not uniform, changes in color), Diameter (greater than 6 mm which is about the size of a pencil eraser), and Evolving (any changes in preexisting moles).  - Monitor: Patient to continue monitoring at home and will contact the clinic for any changes.  - Sun protection: Counseled SPF30+ sunscreen, UPF clothing, sun avoidance, tanning bed avoidance.     Seborrheic keratoses  - benign      Procedures Performed:   None    Follow-up: one year for FBSE or sooner PRN    Staff: Dr. Blade Whlaen  Internal Medicine PGY-1  ____________________________________________    CC: Derm Problem (FBSE)    HPI:  Ms. Ashly Tan is a(n) 53 year old female who presents today as a return patient for skin check. History of BCC 10/2022.  She denies any itching, ulcerated, nonhealing, or bleeding lesions. She denies any changing lesions or lesions of concern.  Patient is otherwise feeling well, without additional skin concerns.    Labs Reviewed:  N/A    Physical Exam:  Vitals: There were no vitals taken for this visit.  GEN: well developed, well-nourished, in no acute distress, pleasant.     SKIN: Hebert phototype 1  - Full skin, which includes the head/face, both arms, chest, back, abdomen, both legs, genitalia and/or groin buttocks, digits and/or nails, was examined.  - No obvious evidence of recurrence at site of previous malignancy  - Flat brown macules and patches in a sun exposed  areas on face and extremities  - Stuck on brown papules on trunk and extremities   - No other lesions of concern on areas examined.     Medications:  Current Outpatient Medications   Medication    CALCIUM PO    cephALEXin (KEFLEX) 500 MG capsule    citalopram (CELEXA) 40 MG tablet    Ferrous Sulfate (IRON SUPPLEMENT PO)    ibuprofen (ADVIL/MOTRIN) 200 MG tablet    Magnesium Chloride (MAGNESIUM DR PO)    Pseudoephedrine-Ibuprofen (ADVIL COLD/SINUS PO)     No current facility-administered medications for this visit.      Past Medical History:   Patient Active Problem List   Diagnosis    Allergic rhinitis    CARDIOVASCULAR SCREENING; LDL GOAL LESS THAN 160    Mild episode of recurrent major depressive disorder (H24)    Recurrent cystitis    Basal cell carcinoma (BCC) of skin of left lower extremity including hip    Chronic bilateral low back pain without sciatica     Past Medical History:   Diagnosis Date    Allergic rhinitis     vs Mechanical Rhinitis    Basal cell carcinoma (BCC) of skin of left lower extremity including hip 9/8/2022    Depressive disorder 9/27/2011    History of asthma     Occassional    Major depression in complete remission (H24) 9/27/2011    Uncomplicated asthma several years ago    Excercise induced       CC No referring provider defined for this encounter. on close of this encounter.

## 2024-01-12 ENCOUNTER — TRANSFERRED RECORDS (OUTPATIENT)
Dept: HEALTH INFORMATION MANAGEMENT | Facility: CLINIC | Age: 54
End: 2024-01-12
Payer: COMMERCIAL

## 2024-01-22 ENCOUNTER — TRANSFERRED RECORDS (OUTPATIENT)
Dept: HEALTH INFORMATION MANAGEMENT | Facility: CLINIC | Age: 54
End: 2024-01-22
Payer: COMMERCIAL

## 2024-05-01 ENCOUNTER — ANCILLARY PROCEDURE (OUTPATIENT)
Dept: MAMMOGRAPHY | Facility: CLINIC | Age: 54
End: 2024-05-01
Attending: FAMILY MEDICINE
Payer: COMMERCIAL

## 2024-05-01 DIAGNOSIS — Z12.31 VISIT FOR SCREENING MAMMOGRAM: ICD-10-CM

## 2024-05-01 PROCEDURE — 77063 BREAST TOMOSYNTHESIS BI: CPT | Mod: GC | Performed by: STUDENT IN AN ORGANIZED HEALTH CARE EDUCATION/TRAINING PROGRAM

## 2024-05-01 PROCEDURE — 77067 SCR MAMMO BI INCL CAD: CPT | Mod: GC | Performed by: STUDENT IN AN ORGANIZED HEALTH CARE EDUCATION/TRAINING PROGRAM

## 2024-08-05 ENCOUNTER — TELEPHONE (OUTPATIENT)
Dept: GASTROENTEROLOGY | Facility: CLINIC | Age: 54
End: 2024-08-05
Payer: COMMERCIAL

## 2024-08-05 NOTE — TELEPHONE ENCOUNTER
"Endoscopy Scheduling Screen    Have you had a positive Covid test in the last 14 days?  No    What is your communication preference for Instructions and/or Bowel Prep?   MyChart    What insurance is in the chart?  Other:      Ordering/Referring Provider:     KEELY CHRISTIANSON      (If ordering provider performs procedure, schedule with ordering provider unless otherwise instructed. )    BMI: Estimated body mass index is 28.59 kg/m  as calculated from the following:    Height as of 9/12/23: 1.727 m (5' 8\").    Weight as of 9/12/23: 85.3 kg (188 lb).     Sedation Ordered  moderate sedation.   If patient BMI > 50 do not schedule in [facility].    If patient BMI > 45 do not schedule at [facility].    Are you taking methadone or Suboxone?  No    Have you had difficulties, pain, or discomfort during past endoscopy procedures?  No    Are you taking any prescription medications for pain 3 or more times per week?   NO, No RN review required.    Do you have a history of malignant hyperthermia?  No    (Females) Are you currently pregnant?   No     Have you been diagnosed or told you have pulmonary hypertension?   No    Do you have an LVAD?  No    Have you been told you have moderate to severe sleep apnea?  No    Have you been told you have COPD, asthma, or any other lung disease?  No    Do you have any heart conditions?  No     Have you ever had or are you waiting for an organ transplant?  No. Continue scheduling, no site restrictions.    Have you had a stroke or transient ischemic attack (TIA aka \"mini stroke\" in the last 6 months?   No    Have you been diagnosed with or been told you have cirrhosis of the liver?   No    Are you currently on dialysis?   No    Do you need assistance transferring?   No    BMI: Estimated body mass index is 28.59 kg/m  as calculated from the following:    Height as of 9/12/23: 1.727 m (5' 8\").    Weight as of 9/12/23: 85.3 kg (188 lb).     Is patients BMI > 40 and scheduling location [facility]?  No    Do you " take an injectable medication for weight loss or diabetes (excluding insulin)?  No    Do you take the medication Naltrexone?  No    Do you take blood thinners?  No       Prep   Are you currently on dialysis or do you have chronic kidney disease?  No    Do you have a diagnosis of diabetes?  No    Do you have a diagnosis of cystic fibrosis (CF)?  No    On a regular basis do you go 3 -5 days between bowel movements?  No    BMI > 40?  No    Preferred Pharmacy:    [id number] [place] [address]  [address] [facility] [address]  Phone: [phone] Fax: [phone]      Final Scheduling Details     Procedure scheduled  Colonoscopy    Surgeon:  MARCOS     Date of procedure:  09/23/2024     Pre-OP / PAC:   No - Not required for this site.    Location  [facility] - [facility] - Per order.    Sedation   Moderate Sedation - Per order.      Patient Reminders:   You will receive a call from a Nurse to review instructions and health history.  This assessment must be completed prior to your procedure.  Failure to complete the Nurse assessment may result in the procedure being cancelled.      On the day of your procedure, please designate an adult(s) who can drive you home stay with you for the next 24 hours. The medicines used in the exam will make you sleepy. You will not be able to drive.      You cannot take public transportation, ride share services, or non-medical taxi service without a responsible caregiver.  Medical transport services are allowed with the requirement that a responsible caregiver will receive you at your destination.  We require that drivers and caregivers are confirmed prior to your procedure.

## 2024-09-13 ENCOUNTER — TELEPHONE (OUTPATIENT)
Dept: GASTROENTEROLOGY | Facility: CLINIC | Age: 54
End: 2024-09-13
Payer: COMMERCIAL

## 2024-09-13 NOTE — TELEPHONE ENCOUNTER
Pre visit planning completed.      Procedure details:    Patient scheduled for Colonoscopy on 9/23/2024.     Arrival time: 0845. Procedure time 0930    Facility location: [facility]; [address] Floor, [place] [address]. Check in location: 2nd Floor at Surgery desk.    Sedation type: Conscious sedation - tolerated well last procedure     Pre op exam needed? No.    Indication for procedure: Screening      Chart review:     Electronic implanted devices? No    Recent diagnosis of diverticulitis within the last 6 weeks? No    Asthma: Exercise induced- uncomplicated     Medication review:    Diabetic? No    Anticoagulants? No    Weight loss medication/injectable? No GLP-1 medication per patient's medication list.  RN will verify with pre-assessment call.    Other medication HOLDING recommendations:  Ferrous sulfate (iron supplements): HOLD 7 days before procedure.- pt reported, please verify       Prep for procedure:     Bowel prep recommendation: Standard Miralax  Due to: standard bowel prep.    Prep instructions sent via Dragon Ports         Edilia Collazo RN  Endoscopy Procedure Pre Assessment RN  [phone] option 2

## 2024-09-16 NOTE — TELEPHONE ENCOUNTER
Pre assessment completed for upcoming procedure.   (Please see previous telephone encounter notes for complete details)      Procedure details:    Arrival time and facility location reviewed.    Pre op exam needed? No.    Designated  policy reviewed. Instructed to have someone stay 6  hours post procedure.       Medication review:    Ferrous Sulfate (iron supplement): HOLD 7 days before procedure.      Prep for procedure:     Procedure prep instructions reviewed.        Any additional information needed:  N/A      Patient  verbalized understanding and had no questions or concerns at this time.      Edilia Collazo RN  Endoscopy Procedure Pre Assessment   [phone] option 2

## 2024-09-23 ENCOUNTER — HOSPITAL ENCOUNTER (OUTPATIENT)
Facility: AMBULATORY SURGERY CENTER | Age: 54
Discharge: HOME | End: 2024-09-23
Attending: INTERNAL MEDICINE | Admitting: INTERNAL MEDICINE
Payer: COMMERCIAL

## 2024-09-23 ENCOUNTER — PATIENT OUTREACH (OUTPATIENT)
Dept: GASTROENTEROLOGY | Facility: CLINIC | Age: 54
End: 2024-09-23
Payer: COMMERCIAL

## 2024-09-23 VITALS
OXYGEN SATURATION: 98 % | TEMPERATURE: 97 F | SYSTOLIC BLOOD PRESSURE: 112 MMHG | RESPIRATION RATE: 16 BRPM | DIASTOLIC BLOOD PRESSURE: 76 MMHG | HEART RATE: 60 BPM

## 2024-09-23 LAB — COLONOSCOPY: NORMAL

## 2024-09-23 PROCEDURE — G8907 PT DOC NO EVENTS ON DISCHARG: HCPCS

## 2024-09-23 PROCEDURE — 45378 DIAGNOSTIC COLONOSCOPY: CPT

## 2024-09-23 PROCEDURE — G8918 PT W/O PREOP ORDER IV AB PRO: HCPCS

## 2024-09-23 RX ORDER — PROCHLORPERAZINE MALEATE 10 MG
10 TABLET ORAL EVERY 6 HOURS PRN
Status: DISCONTINUED | OUTPATIENT
Start: 2024-09-23 | End: 2024-09-24 | Stop reason: HOSPADM

## 2024-09-23 RX ORDER — ONDANSETRON 4 MG/1
4 TABLET, ORALLY DISINTEGRATING ORAL EVERY 6 HOURS PRN
Status: DISCONTINUED | OUTPATIENT
Start: 2024-09-23 | End: 2024-09-24 | Stop reason: HOSPADM

## 2024-09-23 RX ORDER — ONDANSETRON 2 MG/ML
4 INJECTION INTRAMUSCULAR; INTRAVENOUS EVERY 6 HOURS PRN
Status: DISCONTINUED | OUTPATIENT
Start: 2024-09-23 | End: 2024-09-24 | Stop reason: HOSPADM

## 2024-09-23 RX ORDER — ONDANSETRON 2 MG/ML
4 INJECTION INTRAMUSCULAR; INTRAVENOUS
Status: DISCONTINUED | OUTPATIENT
Start: 2024-09-23 | End: 2024-09-24 | Stop reason: HOSPADM

## 2024-09-23 RX ORDER — FLUMAZENIL 0.1 MG/ML
0.2 INJECTION, SOLUTION INTRAVENOUS
Status: ACTIVE | OUTPATIENT
Start: 2024-09-23 | End: 2024-09-23

## 2024-09-23 RX ORDER — NALOXONE HYDROCHLORIDE 0.4 MG/ML
0.2 INJECTION, SOLUTION INTRAMUSCULAR; INTRAVENOUS; SUBCUTANEOUS
Status: DISCONTINUED | OUTPATIENT
Start: 2024-09-23 | End: 2024-09-24 | Stop reason: HOSPADM

## 2024-09-23 RX ORDER — FENTANYL CITRATE 50 UG/ML
INJECTION, SOLUTION INTRAMUSCULAR; INTRAVENOUS PRN
Status: DISCONTINUED | OUTPATIENT
Start: 2024-09-23 | End: 2024-09-23 | Stop reason: HOSPADM

## 2024-09-23 RX ORDER — NALOXONE HYDROCHLORIDE 0.4 MG/ML
0.4 INJECTION, SOLUTION INTRAMUSCULAR; INTRAVENOUS; SUBCUTANEOUS
Status: DISCONTINUED | OUTPATIENT
Start: 2024-09-23 | End: 2024-09-24 | Stop reason: HOSPADM

## 2024-09-23 RX ORDER — LIDOCAINE 40 MG/G
CREAM TOPICAL
Status: DISCONTINUED | OUTPATIENT
Start: 2024-09-23 | End: 2024-09-24 | Stop reason: HOSPADM

## 2024-09-23 NOTE — H&P
[facility] Anesthesia Pre-op History and Physical    Ashly Tan MRN# [id number]   Age: 54 year old YOB: 1970            Date of Exam 9/23/2024       Primary care provider: Amber Ahn         Chief Complaint and/or Reason for Procedure:     History of advanced adenoma         Active problem list:     Patient Active Problem List    Diagnosis Date Noted    Chronic bilateral low back pain without sciatica 01/09/2023     Priority: Medium    Basal cell carcinoma (BCC) of skin of left lower extremity including hip 09/08/2022     Priority: Medium    Recurrent cystitis 09/06/2022     Priority: Medium    Mild episode of recurrent major depressive disorder (H24) 09/27/2011     Priority: Medium     Treated to remission - still on citalopram      CARDIOVASCULAR SCREENING; LDL GOAL LESS THAN 160 10/31/2010     Priority: Medium    Allergic rhinitis      Priority: Medium     vs Mechanical Rhinitis              Medications (include herbals and vitamins):   Any Plavix use in the last 7 days? No     Current Outpatient Medications   Medication Sig Dispense Refill    cephALEXin (KEFLEX) 500 MG capsule Take 1 capsule (500 mg) by mouth 3 times daily 30 capsule 5    citalopram (CELEXA) 40 MG tablet Take 1 tablet (40 mg) by mouth daily 90 tablet 3    Ferrous Sulfate (IRON SUPPLEMENT PO)       ibuprofen (ADVIL/MOTRIN) 200 MG tablet Take 200 mg by mouth every 4 hours as needed for mild pain      Magnesium Chloride (MAGNESIUM DR PO) Take by mouth daily      Pseudoephedrine-Ibuprofen (ADVIL COLD/SINUS PO)       CALCIUM PO        Current Facility-Administered Medications   Medication Dose Route Frequency Provider Last Rate Last Admin    lidocaine (LMX4) kit   Topical Q1H PRN Janet Ramírez,         lidocaine 1 % 0.1-1 mL  0.1-1 mL Other Q1H PRN Janet Ramírez, DO        ondansetron (ZOFRAN) injection 4 mg  4 mg Intravenous Once PRN Janet Ramírez,         sodium chloride (PF) 0.9% PF flush 3 mL  3  mL Intracatheter Q8H Janet Ramírez DO        sodium chloride (PF) 0.9% PF flush 3 mL  3 mL Intracatheter q1 min prn Janet Ramírez DO                 Allergies:    No Known Allergies  Allergy to Latex? No  Allergy to tape?   No  Intolerances:             Physical Exam:   All vitals have been reviewed  Patient Vitals for the past 8 hrs:   BP Temp Temp src Pulse Resp SpO2   09/23/24 0850 (!) 144/88 97  F (36.1  C) Temporal 70 16 95 %     No intake/output data recorded.  Lungs:   no increased work of breathing     Cardiovascular:   RRR             Lab / Radiology Results:          Anesthetic risk and/or ASA classification:   2    Janet Ramírez DO

## 2024-10-13 ENCOUNTER — HEALTH MAINTENANCE LETTER (OUTPATIENT)
Age: 54
End: 2024-10-13

## 2024-11-03 DIAGNOSIS — F33.0 MILD EPISODE OF RECURRENT MAJOR DEPRESSIVE DISORDER (H): ICD-10-CM

## 2024-11-04 RX ORDER — CITALOPRAM HYDROBROMIDE 40 MG/1
40 TABLET ORAL DAILY
Qty: 90 TABLET | Refills: 0 | Status: SHIPPED | OUTPATIENT
Start: 2024-11-04

## 2024-11-19 DIAGNOSIS — N30.90 RECURRENT CYSTITIS: ICD-10-CM

## 2024-11-19 RX ORDER — CEPHALEXIN 500 MG/1
500 CAPSULE ORAL 3 TIMES DAILY
Qty: 30 CAPSULE | Refills: 5 | Status: SHIPPED | OUTPATIENT
Start: 2024-11-19

## 2024-12-16 SDOH — HEALTH STABILITY: PHYSICAL HEALTH: ON AVERAGE, HOW MANY MINUTES DO YOU ENGAGE IN EXERCISE AT THIS LEVEL?: 50 MIN

## 2024-12-16 SDOH — HEALTH STABILITY: PHYSICAL HEALTH: ON AVERAGE, HOW MANY DAYS PER WEEK DO YOU ENGAGE IN MODERATE TO STRENUOUS EXERCISE (LIKE A BRISK WALK)?: 3 DAYS

## 2024-12-16 ASSESSMENT — PATIENT HEALTH QUESTIONNAIRE - PHQ9
SUM OF ALL RESPONSES TO PHQ QUESTIONS 1-9: 3
SUM OF ALL RESPONSES TO PHQ QUESTIONS 1-9: 3
10. IF YOU CHECKED OFF ANY PROBLEMS, HOW DIFFICULT HAVE THESE PROBLEMS MADE IT FOR YOU TO DO YOUR WORK, TAKE CARE OF THINGS AT HOME, OR GET ALONG WITH OTHER PEOPLE: NOT DIFFICULT AT ALL

## 2024-12-16 ASSESSMENT — SOCIAL DETERMINANTS OF HEALTH (SDOH): HOW OFTEN DO YOU GET TOGETHER WITH FRIENDS OR RELATIVES?: ONCE A WEEK

## 2024-12-17 ENCOUNTER — OFFICE VISIT (OUTPATIENT)
Dept: FAMILY MEDICINE | Facility: CLINIC | Age: 54
End: 2024-12-17
Payer: COMMERCIAL

## 2024-12-17 VITALS
OXYGEN SATURATION: 98 % | HEIGHT: 69 IN | TEMPERATURE: 97.3 F | DIASTOLIC BLOOD PRESSURE: 76 MMHG | RESPIRATION RATE: 16 BRPM | WEIGHT: 201.4 LBS | SYSTOLIC BLOOD PRESSURE: 118 MMHG | BODY MASS INDEX: 29.83 KG/M2 | HEART RATE: 65 BPM

## 2024-12-17 DIAGNOSIS — M48.061 SPINAL STENOSIS, LUMBAR REGION, WITHOUT NEUROGENIC CLAUDICATION: ICD-10-CM

## 2024-12-17 DIAGNOSIS — N30.90 RECURRENT CYSTITIS: ICD-10-CM

## 2024-12-17 DIAGNOSIS — N95.1 MENOPAUSAL SYNDROME (HOT FLASHES): ICD-10-CM

## 2024-12-17 DIAGNOSIS — Z13.1 DIABETES MELLITUS SCREENING: ICD-10-CM

## 2024-12-17 DIAGNOSIS — Z00.00 ROUTINE GENERAL MEDICAL EXAMINATION AT A HEALTH CARE FACILITY: Primary | ICD-10-CM

## 2024-12-17 DIAGNOSIS — Z12.73 OVARIAN CANCER SCREENING: ICD-10-CM

## 2024-12-17 DIAGNOSIS — Z13.220 SCREENING CHOLESTEROL LEVEL: ICD-10-CM

## 2024-12-17 DIAGNOSIS — F33.0 MILD EPISODE OF RECURRENT MAJOR DEPRESSIVE DISORDER (H): ICD-10-CM

## 2024-12-17 LAB
CANCER AG125 SERPL-ACNC: 14 U/ML
CHOLEST SERPL-MCNC: 225 MG/DL
FASTING STATUS PATIENT QL REPORTED: YES
FASTING STATUS PATIENT QL REPORTED: YES
GLUCOSE SERPL-MCNC: 91 MG/DL (ref 70–99)
HDLC SERPL-MCNC: 57 MG/DL
LDLC SERPL CALC-MCNC: 142 MG/DL
NONHDLC SERPL-MCNC: 168 MG/DL
TRIGL SERPL-MCNC: 129 MG/DL

## 2024-12-17 PROCEDURE — 80061 LIPID PANEL: CPT | Performed by: FAMILY MEDICINE

## 2024-12-17 PROCEDURE — 99396 PREV VISIT EST AGE 40-64: CPT | Performed by: FAMILY MEDICINE

## 2024-12-17 PROCEDURE — 86304 IMMUNOASSAY TUMOR CA 125: CPT | Performed by: FAMILY MEDICINE

## 2024-12-17 PROCEDURE — 82947 ASSAY GLUCOSE BLOOD QUANT: CPT | Performed by: FAMILY MEDICINE

## 2024-12-17 PROCEDURE — 36415 COLL VENOUS BLD VENIPUNCTURE: CPT | Performed by: FAMILY MEDICINE

## 2024-12-17 RX ORDER — GABAPENTIN 100 MG/1
100 CAPSULE ORAL AT BEDTIME
Qty: 30 CAPSULE | Refills: 0 | Status: SHIPPED | OUTPATIENT
Start: 2024-12-17

## 2024-12-17 RX ORDER — CITALOPRAM HYDROBROMIDE 40 MG/1
40 TABLET ORAL DAILY
Qty: 90 TABLET | Refills: 3 | Status: SHIPPED | OUTPATIENT
Start: 2024-12-17

## 2024-12-17 ASSESSMENT — PAIN SCALES - GENERAL: PAINLEVEL_OUTOF10: NO PAIN (0)

## 2024-12-17 NOTE — LETTER
"December 18, 2024      Brittany Tan  [address]  [place] [address]          Steffanie Soto,     Overall things are fine.  Sugar level looks great and that  is perfectly normal.   Cholesterol is up a little bit which is probably not unexpected.  The good news is that your HDL (\"good\" cholesterol) remains fantastic.  So this basically comes down to eating healthy and plenty of exercise.     Resulted Orders   Lipid panel reflex to direct LDL Fasting   Result Value Ref Range    Cholesterol 225 (H) <200 mg/dL    Triglycerides 129 <150 mg/dL    Direct Measure HDL 57 >=50 mg/dL    LDL Cholesterol Calculated 142 (H) <100 mg/dL    Non HDL Cholesterol 168 (H) <130 mg/dL    Patient Fasting > 8hrs? Yes     Narrative    Cholesterol  Desirable: < 200 mg/dL  Borderline High: 200 - 239 mg/dL  High: >= 240 mg/dL    Triglycerides  Normal: < 150 mg/dL  Borderline High: 150 - 199 mg/dL  High: 200-499 mg/dL  Very High: >= 500 mg/dL    Direct Measure HDL  Female: >= 50 mg/dL   Male: >= 40 mg/dL    LDL Cholesterol  Desirable: < 100 mg/dL  Above Desirable: 100 - 129 mg/dL   Borderline High: 130 - 159 mg/dL   High:  160 - 189 mg/dL   Very High: >= 190 mg/dL    Non HDL Cholesterol  Desirable: < 130 mg/dL  Above Desirable: 130 - 159 mg/dL  Borderline High: 160 - 189 mg/dL  High: 190 - 219 mg/dL  Very High: >= 220 mg/dL   Glucose   Result Value Ref Range    Glucose 91 70 - 99 mg/dL    Patient Fasting > 8hrs? Yes       Result Value Ref Range     14 <=38 U/mL    Narrative    This result is obtained using the Roche Elecsys  II method on the grover e801 immunoassay analyzer. Results obtained with different assay methods or kits cannot be used interchangeably.       If you have any questions or concerns, please call the clinic at the number listed above.       Sincerely,      Amber Ahn MD            "

## 2024-12-17 NOTE — PROGRESS NOTES
"Preventive Care Visit  [facility] KATHLEEN Clark Vladimir Ahn MD, Family Medicine  Dec 17, 2024      Assessment & Plan     Routine general medical examination at a health care facility  Routine health maintenance otherwise up-to-date.  She is scheduled to get her second shingles vaccine after the holidays    Mild episode of recurrent major depressive disorder (H)  Stable on current regimen.  Continue same plan and routine follow-up.   - citalopram (CELEXA) 40 MG tablet; Take 1 tablet (40 mg) by mouth daily.    Recurrent cystitis  Stable on current regimen.  Continue same plan and routine follow-up.     Spinal stenosis, lumbar region, without neurogenic claudication  Has been working with [facility] orthopedics on her spine issues.  Facet injections not helpful.  So they began discussing whether to do an epidural steroid injection.  She and I talked about the pluses and minuses.  She is having a lot of trouble with vasomotor symptoms of menopause and that may be something like gabapentin helps.  So it may help a little bit with the back pain as well.  - gabapentin (NEURONTIN) 100 MG capsule; Take 1 capsule (100 mg) by mouth at bedtime.    Menopausal syndrome (hot flashes)  Discussed mechanism of action of the proposed medication, as well as potential effects, both good and bad.  Patient expressed understanding and agreed with treatment.   - gabapentin (NEURONTIN) 100 MG capsule; Take 1 capsule (100 mg) by mouth at bedtime.    Ovarian cancer screening  Is concerned about some of the abdominal bloating and has a friend who had similar and had ovarian cancer.  So we had a long discussion about what we know about ovarian cancer and the fact that there is not a specific \"screening\" test per se at this point.  Some people have looked at  is a marker and it can be reassuring in the now if it is low.  Its limitations are the fact that it is not predictive.  She is still interested in looking at it " "at least as a spot test and I think that is reasonable.  - ; Future  -     Screening cholesterol level    - Lipid panel reflex to direct LDL Fasting; Future  - Lipid panel reflex to direct LDL Fasting    Diabetes mellitus screening    - Glucose; Future  - Glucose    Patient has been advised of split billing requirements and indicates understanding: Yes        BMI  Estimated body mass index is 29.96 kg/m  as calculated from the following:    Height as of this encounter: 1.746 m (5' 8.75\").    Weight as of this encounter: 91.4 kg (201 lb 6.4 oz).   Weight management plan: Discussed healthy diet and exercise guidelines    Counseling  Appropriate preventive services were addressed with this patient via screening, questionnaire, or discussion as appropriate for fall prevention, nutrition, physical activity, Tobacco-use cessation, social engagement, weight loss and cognition.  Checklist reviewing preventive services available has been given to the patient.  Reviewed patient's diet, addressing concerns and/or questions.   She is at risk for lack of exercise and has been provided with information to increase physical activity for the benefit of her well-being.       Regular exercise  See Patient Instructions    Subjective   Brittany is a 54 year old, presenting for the following:  Physical        12/17/2024     7:38 AM   Additional Questions   Roomed by Vivek   Accompanied by Self         12/17/2024     7:38 AM   Patient Reported Additional Medications   Patient reports taking the following new medications None          HPI  Here today for routine checkup        Health Care Directive  Patient does not have a Health Care Directive:       12/16/2024   General Health   How would you rate your overall physical health? Good   Feel stress (tense, anxious, or unable to sleep) Not at all            12/16/2024   Nutrition   Three or more servings of calcium each day? Yes   Diet: Regular (no restrictions)   How many servings " of fruit and vegetables per day? (!) 2-3   How many sweetened beverages each day? 0-1            12/16/2024   Exercise   Days per week of moderate/strenous exercise 3 days   Average minutes spent exercising at this level 50 min            12/16/2024   Social Factors   Frequency of gathering with friends or relatives Once a week   Worry food won't last until get money to buy more No   Food not last or not have enough money for food? No   Do you have housing? (Housing is defined as stable permanent housing and does not include staying ouside in a car, in a tent, in an abandoned building, in an overnight shelter, or couch-surfing.) Yes   Are you worried about losing your housing? No   Lack of transportation? No   Unable to get utilities (heat,electricity)? No            12/16/2024   Fall Risk   Fallen 2 or more times in the past year? No    Trouble with walking or balance? No        Patient-reported          12/16/2024   Dental   Dentist two times every year? Yes            12/16/2024   TB Screening   Were you born outside of the [place]? No          Today's PHQ-9 Score:       12/16/2024     3:02 PM   PHQ-9 SCORE   PHQ-9 Total Score MyChart 3 (Minimal depression)   PHQ-9 Total Score 3        Patient-reported         12/16/2024   Substance Use   Alcohol more than 3/day or more than 7/wk No   Do you use any other substances recreationally? No        Social History     Tobacco Use    Smoking status: Never     Passive exposure: Never    Smokeless tobacco: Never   Substance Use Topics    Alcohol use: Yes     Comment: 3-4 per week    Drug use: No           5/1/2024   LAST FHS-7 RESULTS   1st degree relative breast or ovarian cancer No   Any relative bilateral breast cancer No   Any male have breast cancer No   Any ONE woman have BOTH breast AND ovarian cancer No   Any woman with breast cancer before 50yrs No   2 or more relatives with breast AND/OR ovarian cancer No   2 or more relatives with breast AND/OR bowel cancer No            Mammogram Screening - Mammogram every 1-2 years updated in Health Maintenance based on mutual decision making        12/16/2024   STI Screening   New sexual partner(s) since last STI/HIV test? No        History of abnormal Pap smear: No - age 30-64 HPV with reflex Pap every 5 years recommended        Latest Ref Rng & Units 9/6/2022     8:04 AM 3/13/2017     2:52 PM 3/13/2017     2:13 PM   PAP / HPV   PAP  Negative for Intraepithelial Lesion or Malignancy (NILM)      PAP (Historical)    NIL    HPV 16 DNA Negative Negative  Negative     HPV 18 DNA Negative Negative  Negative     Other HR HPV Negative Negative  Negative       ASCVD Risk   The 10-year ASCVD risk score (Heidi HARVEY, et al., 2019) is: 1.4%    Values used to calculate the score:      Age: 54 years      Sex: Female      Is Non- : No      Diabetic: No      Tobacco smoker: No      Systolic Blood Pressure: 118 mmHg      Is BP treated: No      HDL Cholesterol: 56 mg/dL      Total Cholesterol: 182 mg/dL           Reviewed and updated as needed this visit by Provider   Tobacco  Allergies  Meds  Problems  Med Hx  Surg Hx  Fam Hx            Lab work is in process  Labs reviewed in EPIC  BP Readings from Last 3 Encounters:   12/17/24 118/76   09/23/24 112/76   09/12/23 115/80    Wt Readings from Last 3 Encounters:   12/17/24 91.4 kg (201 lb 6.4 oz)   09/12/23 85.3 kg (188 lb)   04/04/23 81.6 kg (180 lb)                      Review of Systems  CONSTITUTIONAL: NEGATIVE for fever, chills, change in weight  INTEGUMENTARY/SKIN: NEGATIVE for worrisome rashes, moles or lesions  EYES: NEGATIVE for vision changes or irritation  ENT/MOUTH: NEGATIVE for ear, mouth and throat problems  RESP: NEGATIVE for significant cough or SOB  BREAST: NEGATIVE for masses, tenderness or discharge  CV: NEGATIVE for chest pain, palpitations or peripheral edema  GI: NEGATIVE for nausea, abdominal pain, heartburn, or change in bowel habits  : NEGATIVE for  "frequency, dysuria, or hematuria  MUSCULOSKELETAL: NEGATIVE for significant arthralgias or myalgia  NEURO: NEGATIVE for weakness, dizziness or paresthesias  ENDOCRINE: NEGATIVE for temperature intolerance, skin/hair changes  HEME: NEGATIVE for bleeding problems  PSYCHIATRIC: NEGATIVE for changes in mood or affect     Objective    Exam  /76 (BP Location: Right arm, Patient Position: Sitting, Cuff Size: Adult Large)   Pulse 65   Temp 97.3  F (36.3  C) (Tympanic)   Resp 16   Ht 1.746 m (5' 8.75\")   Wt 91.4 kg (201 lb 6.4 oz)   LMP 10/05/2024 (Exact Date)   SpO2 98%   BMI 29.96 kg/m     Estimated body mass index is 29.96 kg/m  as calculated from the following:    Height as of this encounter: 1.746 m (5' 8.75\").    Weight as of this encounter: 91.4 kg (201 lb 6.4 oz).    Physical Exam  GENERAL: alert and no distress  EYES: Eyes grossly normal to inspection, PERRL and conjunctivae and sclerae normal  HENT: ear canals and TM's normal, nose and mouth without ulcers or lesions  NECK: no adenopathy, no asymmetry, masses, or scars  RESP: lungs clear to auscultation - no rales, rhonchi or wheezes  CV: regular rate and rhythm, normal S1 S2, no S3 or S4, no murmur, click or rub, no peripheral edema  ABDOMEN: soft, nontender, no hepatosplenomegaly, no masses and bowel sounds normal  MS: no gross musculoskeletal defects noted, no edema  SKIN: no suspicious lesions or rashes  NEURO: Normal strength and tone, mentation intact and speech normal  PSYCH: mentation appears normal, affect normal/bright        Signed Electronically by: Amber Ahn MD    "

## 2024-12-17 NOTE — PATIENT INSTRUCTIONS
Patient Education   Preventive Care Advice   This is general advice given by our system to help you stay healthy. However, your care team may have specific advice just for you. Please talk to your care team about your preventive care needs.  Nutrition  Eat 5 or more servings of fruits and vegetables each day.  Try wheat bread, brown rice and whole grain pasta (instead of white bread, rice, and pasta).  Get enough calcium and vitamin D. Check the label on foods and aim for 100% of the RDA (recommended daily allowance).  Lifestyle  Exercise at least 150 minutes each week  (30 minutes a day, 5 days a week).  Do muscle strengthening activities 2 days a week. These help control your weight and prevent disease.  No smoking.  Wear sunscreen to prevent skin cancer.  Have a dental exam and cleaning every 6 months.  Yearly exams  See your health care team every year to talk about:  Any changes in your health.  Any medicines your care team has prescribed.  Preventive care, family planning, and ways to prevent chronic diseases.  Shots (vaccines)   HPV shots (up to age 26), if you've never had them before.  Hepatitis B shots (up to age 59), if you've never had them before.  COVID-19 shot: Get this shot when it's due.  Flu shot: Get a flu shot every year.  Tetanus shot: Get a tetanus shot every 10 years.  Pneumococcal, hepatitis A, and RSV shots: Ask your care team if you need these based on your risk.  Shingles shot (for age 50 and up)  General health tests  Diabetes screening:  Starting at age 35, Get screened for diabetes at least every 3 years.  If you are younger than age 35, ask your care team if you should be screened for diabetes.  Cholesterol test: At age 39, start having a cholesterol test every 5 years, or more often if advised.  Bone density scan (DEXA): At age 50, ask your care team if you should have this scan for osteoporosis (brittle bones).  Hepatitis C: Get tested at least once in your life.  STIs (sexually  transmitted infections)  Before age 24: Ask your care team if you should be screened for STIs.  After age 24: Get screened for STIs if you're at risk. You are at risk for STIs (including HIV) if:  You are sexually active with more than one person.  You don't use condoms every time.  You or a partner was diagnosed with a sexually transmitted infection.  If you are at risk for HIV, ask about PrEP medicine to prevent HIV.  Get tested for HIV at least once in your life, whether you are at risk for HIV or not.  Cancer screening tests  Cervical cancer screening: If you have a cervix, begin getting regular cervical cancer screening tests starting at age 21.  Breast cancer scan (mammogram): If you've ever had breasts, begin having regular mammograms starting at age 40. This is a scan to check for breast cancer.  Colon cancer screening: It is important to start screening for colon cancer at age 45.  Have a colonoscopy test every 10 years (or more often if you're at risk) Or, ask your provider about stool tests like a FIT test every year or Cologuard test every 3 years.  To learn more about your testing options, visit:   .  For help making a decision, visit:   https://bit.ly/yr21327.  Prostate cancer screening test: If you have a prostate, ask your care team if a prostate cancer screening test (PSA) at age 55 is right for you.  Lung cancer screening: If you are a current or former smoker ages 50 to 80, ask your care team if ongoing lung cancer screenings are right for you.  For informational purposes only. Not to replace the advice of your health care provider. Copyright   2023 [employer]. All rights reserved. Clinically reviewed by the [facility] Transitions Program. [employer] 750280 - REV 01/24.     VOLTAREN GEL

## 2024-12-17 NOTE — RESULT ENCOUNTER NOTE
"Brittany,  Overall things are fine.  Sugar level looks great and that  is perfectly normal.  Cholesterol is up a little bit which is probably not unexpected.  The good news is that your HDL (\"good\" cholesterol) remains fantastic.  So this basically comes down to eating healthy and plenty of exercise.  SHAHEED Ahn M.D. "

## 2024-12-19 ENCOUNTER — OFFICE VISIT (OUTPATIENT)
Dept: DERMATOLOGY | Facility: CLINIC | Age: 54
End: 2024-12-19
Payer: COMMERCIAL

## 2024-12-19 DIAGNOSIS — L81.4 LENTIGINES: ICD-10-CM

## 2024-12-19 DIAGNOSIS — L57.8 ACTINIC SKIN DAMAGE: ICD-10-CM

## 2024-12-19 DIAGNOSIS — Z85.828 HISTORY OF BASAL CELL CARCINOMA: Primary | ICD-10-CM

## 2024-12-19 ASSESSMENT — PAIN SCALES - GENERAL: PAINLEVEL_OUTOF10: NO PAIN (0)

## 2024-12-19 NOTE — NURSING NOTE
Dermatology Rooming Note    Ashly Tan's goals for this visit include:   Chief Complaint   Patient presents with    Skin Check     FBSE- No areas of concern.      Jus Long, EMT  Clinic Support  [facility]     [phone]    Employed by [employer]

## 2024-12-19 NOTE — LETTER
12/19/2024       RE: Ashly Tan  [address]  [place] [address]     Dear Colleague,    Thank you for referring your patient, Ashly Tan, to the [facility] [place] at [facility]. Please see a copy of my visit note below.    [facility] Dermatology Note  Encounter Date: Dec 19, 2024  Office Visit     Dermatology Problem List:   #NMSC  - L thigh, BCC, s/p excision 10/25/22    ____________________________________________    Assessment & Plan:  #History of basal cell carcinoma, L thigh  - No obvious evidence of recurrence at site of previous malignancy  - Patient to continue monitoring at home and will contact the clinic for any changes.    #Actinic skin damage  # Benign lesions: Multiple benign nevi, solar lentigos, seborrheic keratoses.  - Reassurance provided and benign nature of conditions discussed. No treatment is necessary at this time unless the lesion changes or becomes symptomatic.   - ABCDs of melanoma were discussed and self skin checks were advised.  - Sun precaution was advised including the use of sun screens of SPF 30 or higher, sun protective clothing, and avoidance of tanning beds.    Procedures Performed: None    Follow-up: prn for new or changing lesions    Staff and Medical Student:     Patient seen and discussed with Attending Physician, Dr. Murguia.   Claudia Heard, MS3    ____________________________________________    CC: Skin Check (FBSE- No areas of concern. )    HPI:  Ms. Ashly Tan is a(n) 54 year old female who presents today as a return patient for annual FBSE. She has no spots of concern today. Denies any new bleeding, itching, or tender spots. She has not noticed any recurrence in her previous biopsy site. She uses regularly sunscreen on her face daily.     Patient is otherwise feeling well, without additional skin concerns.    Labs:  None reviewed.    Physical  Exam:  Vitals: LMP 10/05/2024 (Exact Date)   SKIN: Full skin, which includes the head/face, both arms, chest, back, abdomen,both legs, genitalia and/or groin buttocks, digits and/or nails, was examined.  - Hebert phototype I  - Flat brown macules and patches in a sun exposed areas on face and extremities  - Multiple dome-shaped bright red papule on the back and abdomen.   - Multiple stuck on brown papules on trunk and extremities   - No other lesions of concern on areas examined.     Medications:  Current Outpatient Medications   Medication Sig Dispense Refill     CALCIUM PO        cephALEXin (KEFLEX) 500 MG capsule TAKE 1 CAPSULE BY MOUTH THREE TIMES A DAY 30 capsule 5     citalopram (CELEXA) 40 MG tablet Take 1 tablet (40 mg) by mouth daily. 90 tablet 3     Ferrous Sulfate (IRON SUPPLEMENT PO)        gabapentin (NEURONTIN) 100 MG capsule Take 1 capsule (100 mg) by mouth at bedtime. 30 capsule 0     ibuprofen (ADVIL/MOTRIN) 200 MG tablet Take 200 mg by mouth every 4 hours as needed for mild pain       Magnesium Chloride (MAGNESIUM DR PO) Take by mouth daily       Pseudoephedrine-Ibuprofen (ADVIL COLD/SINUS PO)        No current facility-administered medications for this visit.      Past Medical History:   Patient Active Problem List   Diagnosis     Allergic rhinitis     CARDIOVASCULAR SCREENING; LDL GOAL LESS THAN 160     Mild episode of recurrent major depressive disorder (H)     Recurrent cystitis     Basal cell carcinoma (BCC) of skin of left lower extremity including hip     Chronic bilateral low back pain without sciatica     Spinal stenosis, lumbar region, without neurogenic claudication     Past Medical History:   Diagnosis Date     Allergic rhinitis     vs Mechanical Rhinitis     Basal cell carcinoma (BCC) of skin of left lower extremity including hip 9/8/2022     Depressive disorder 9/27/2011     History of asthma     Occassional     Major depression in complete remission (H) 9/27/2011      Uncomplicated asthma several years ago    Excercise induced        CC Referred Self, MD  No address on file on close of this encounter.      Attestation signed by Federico Murguia MD at 12/19/2024  2:24 PM:  I have personally examined this patient and agree with the medical student's documentation and plan of care. I have reviewed and amended the medical student's note as necessary.The documentation accurately reflects my clinical observations, diagnoses, treatment and follow-up plans.     Federico Murguia MD  Dermatology Staff      Again, thank you for allowing me to participate in the care of your patient.      Sincerely,    Fdeerico Murguia MD

## 2024-12-19 NOTE — PROGRESS NOTES
[facility] Dermatology Note  Encounter Date: Dec 19, 2024  Office Visit     Dermatology Problem List:   #NMSC  - L thigh, BCC, s/p excision 10/25/22    ____________________________________________    Assessment & Plan:  #History of basal cell carcinoma, L thigh  - No obvious evidence of recurrence at site of previous malignancy  - Patient to continue monitoring at home and will contact the clinic for any changes.    #Actinic skin damage  # Benign lesions: Multiple benign nevi, solar lentigos, seborrheic keratoses.  - Reassurance provided and benign nature of conditions discussed. No treatment is necessary at this time unless the lesion changes or becomes symptomatic.   - ABCDs of melanoma were discussed and self skin checks were advised.  - Sun precaution was advised including the use of sun screens of SPF 30 or higher, sun protective clothing, and avoidance of tanning beds.    Procedures Performed: None    Follow-up: prn for new or changing lesions    Staff and Medical Student:     Patient seen and discussed with Attending Physician, Dr. Murguia.   Claudia Heard, MS3    ____________________________________________    CC: Skin Check (FBSE- No areas of concern. )    HPI:  Ms. Ashly Tan is a(n) 54 year old female who presents today as a return patient for annual FBSE. She has no spots of concern today. Denies any new bleeding, itching, or tender spots. She has not noticed any recurrence in her previous biopsy site. She uses regularly sunscreen on her face daily.     Patient is otherwise feeling well, without additional skin concerns.    Labs:  None reviewed.    Physical Exam:  Vitals: LMP 10/05/2024 (Exact Date)   SKIN: Full skin, which includes the head/face, both arms, chest, back, abdomen,both legs, genitalia and/or groin buttocks, digits and/or nails, was examined.  - Hebert phototype I  - Flat brown macules and patches in a sun exposed areas on face and extremities  - Multiple  dome-shaped bright red papule on the back and abdomen.   - Multiple stuck on brown papules on trunk and extremities   - No other lesions of concern on areas examined.     Medications:  Current Outpatient Medications   Medication Sig Dispense Refill    CALCIUM PO       cephALEXin (KEFLEX) 500 MG capsule TAKE 1 CAPSULE BY MOUTH THREE TIMES A DAY 30 capsule 5    citalopram (CELEXA) 40 MG tablet Take 1 tablet (40 mg) by mouth daily. 90 tablet 3    Ferrous Sulfate (IRON SUPPLEMENT PO)       gabapentin (NEURONTIN) 100 MG capsule Take 1 capsule (100 mg) by mouth at bedtime. 30 capsule 0    ibuprofen (ADVIL/MOTRIN) 200 MG tablet Take 200 mg by mouth every 4 hours as needed for mild pain      Magnesium Chloride (MAGNESIUM DR PO) Take by mouth daily      Pseudoephedrine-Ibuprofen (ADVIL COLD/SINUS PO)        No current facility-administered medications for this visit.      Past Medical History:   Patient Active Problem List   Diagnosis    Allergic rhinitis    CARDIOVASCULAR SCREENING; LDL GOAL LESS THAN 160    Mild episode of recurrent major depressive disorder (H)    Recurrent cystitis    Basal cell carcinoma (BCC) of skin of left lower extremity including hip    Chronic bilateral low back pain without sciatica    Spinal stenosis, lumbar region, without neurogenic claudication     Past Medical History:   Diagnosis Date    Allergic rhinitis     vs Mechanical Rhinitis    Basal cell carcinoma (BCC) of skin of left lower extremity including hip 9/8/2022    Depressive disorder 9/27/2011    History of asthma     Occassional    Major depression in complete remission (H) 9/27/2011    Uncomplicated asthma several years ago    Excercise induced        CC Referred Self, MD  No address on file on close of this encounter.

## 2025-02-10 ENCOUNTER — TRANSFERRED RECORDS (OUTPATIENT)
Dept: HEALTH INFORMATION MANAGEMENT | Facility: CLINIC | Age: 55
End: 2025-02-10
Payer: COMMERCIAL

## 2025-04-15 ENCOUNTER — VIRTUAL VISIT (OUTPATIENT)
Dept: FAMILY MEDICINE | Facility: CLINIC | Age: 55
End: 2025-04-15
Payer: COMMERCIAL

## 2025-04-15 DIAGNOSIS — F40.243 FEAR OF FLYING: Primary | ICD-10-CM

## 2025-04-15 PROCEDURE — 98005 SYNCH AUDIO-VIDEO EST LOW 20: CPT | Performed by: FAMILY MEDICINE

## 2025-04-15 RX ORDER — ALPRAZOLAM 0.25 MG
.25-.5 TABLET ORAL 3 TIMES DAILY PRN
Qty: 10 TABLET | Refills: 0 | Status: SHIPPED | OUTPATIENT
Start: 2025-04-15

## 2025-04-15 NOTE — PROGRESS NOTES
"  If patient has telephone visit, have they been educated on video visit as preferred visit method and offered to change to video visit? N/A        Instructions Relayed to Patient by Virtual Roomer:     Patient is active on [employer]:   Relayed following to patient: \"It looks like you are active on [employer], are you able to join the visit this way? If not, do you need us to send you a link now or would you like your provider to send a link via text or email when they are ready to initiate the visit?\"      Patient Confirmed they will join visit via: [employer]  Reminded patient to ensure they were logged on to virtual visit by arrival time listed.   Asked if patient has flexibility to initiate visit sooner than arrival time: patient is unable to initiate visit earlier than arrival time     If pediatric virtual visit, ensured pediatric patient along with parent/guardian will be present for video visit.     Patient offered the website [email or web address] and/or phone number to [employer] Help line: [phone]    Brittany is a 55 year old who is being evaluated via a billable video visit.    How would you like to obtain your AVS? [employer]  If the video visit is dropped, the invitation should be resent by: Text to cell phone: [phone]  Will anyone else be joining your video visit? No      Assessment & Plan     Fear of flying  Upcoming travel and patient has developed a fear flying that seems to have gotten a bit worse over time.  We talked about nonmedicinal measures but we have also successfully used alprazolam in the past and I will go ahead and send in that prescription for her.  - ALPRAZolam (XANAX) 0.25 MG tablet; Take 1-2 tablets (0.25-0.5 mg) by mouth 3 times daily as needed for anxiety.            See Patient Instructions    Subjective   Brittany is a 55 year old, presenting for the following health issues:  Medication Request (Medication for plane ride)        4/15/2025     9:59 AM   Additional Questions "   Roomed by Zaida GRAJEDA   Accompanied by Self         4/15/2025     9:59 AM   Patient Reported Additional Medications   Patient reports taking the following new medications No     History of Present Illness       Reason for visit:  To ask about what I can do about nerves during flying   She is taking medications regularly.          Video visit with patient to talk about the above.      Review of Systems  Constitutional, HEENT, cardiovascular, pulmonary, gi and gu systems are negative, except as otherwise noted.      Objective           Vitals:  No vitals were obtained today due to virtual visit.    Physical Exam   GENERAL: alert and no distress  EYES: Eyes grossly normal to inspection.  No discharge or erythema, or obvious scleral/conjunctival abnormalities.  RESP: No audible wheeze, cough, or visible cyanosis.    SKIN: Visible skin clear. No significant rash, abnormal pigmentation or lesions.  NEURO: Cranial nerves grossly intact.  Mentation and speech appropriate for age.  PSYCH: Appropriate affect, tone, and pace of words    Past labs reviewed with the patient.       Video-Visit Details    Type of service:  Video Visit   Originating Location (pt. Location): Home    Distant Location (provider location):  On-site  Platform used for Video Visit: Yolanda  Signed Electronically by: Amber Ahn MD

## 2025-05-06 ENCOUNTER — ANCILLARY PROCEDURE (OUTPATIENT)
Dept: MAMMOGRAPHY | Facility: CLINIC | Age: 55
End: 2025-05-06
Attending: FAMILY MEDICINE
Payer: COMMERCIAL

## 2025-05-06 DIAGNOSIS — Z12.31 VISIT FOR SCREENING MAMMOGRAM: ICD-10-CM

## 2025-05-06 PROCEDURE — 77063 BREAST TOMOSYNTHESIS BI: CPT

## 2025-05-06 PROCEDURE — 77067 SCR MAMMO BI INCL CAD: CPT

## 2025-07-24 ENCOUNTER — OFFICE VISIT (OUTPATIENT)
Dept: DERMATOLOGY | Facility: CLINIC | Age: 55
End: 2025-07-24
Payer: COMMERCIAL

## 2025-07-24 DIAGNOSIS — L57.8 ACTINIC SKIN DAMAGE: ICD-10-CM

## 2025-07-24 DIAGNOSIS — L81.4 LENTIGINES: ICD-10-CM

## 2025-07-24 DIAGNOSIS — Z85.828 HISTORY OF BASAL CELL CARCINOMA: ICD-10-CM

## 2025-07-24 DIAGNOSIS — L82.0 INFLAMED SEBORRHEIC KERATOSIS: Primary | ICD-10-CM

## 2025-07-24 ASSESSMENT — PAIN SCALES - GENERAL: PAINLEVEL_OUTOF10: NO PAIN (0)

## 2025-07-24 NOTE — NURSING NOTE
Dermatology Rooming Note    Ashly Tan's goals for this visit include:   Chief Complaint   Patient presents with    Derm Problem     Two moles on upper back.  Patient denies pain, Interested in removal options.     Jus Long, EMT  Clinic Support  [facility]     [phone]    Employed by [employer]

## 2025-07-24 NOTE — PROGRESS NOTES
[facility] Dermatology Note    Encounter Date: Jul 24, 2025    Dermatology Problem List:  #NMSC  - L thigh, BCC, s/p excision 10/25/22    Major PMHx  -     Social Hx:  ______________________________________    Impression/Plan:  Brittany was seen today for derm problem.    Diagnoses and all orders for this visit:    Inflamed seborrheic keratosis  -     DESTRUCT BENIGN LESION, UP TO 14    History of basal cell carcinoma  Actinic skin damage  Lentigines  - Reviewed the compounding benefits of incremental changes to sun protective behaviors including increased frequency of sunscreen and sun protective clothing like broad brimmed hats and longsleeved UPF containing clothing        Cryotherapy procedure note: After verbal consent and discussion of risks and benefits including but no limited to dyspigmentation/scar, blister, and pain, 5 ISKs on back was(were) treated with 1-2mm freeze border for 2 cycles with liquid nitrogen. Post cryotherapy instructions were provided.     Follow-up in 1 year.       Staff Involved:  Staff Only    Federico Murguia MD   of Dermatology  Department of Dermatology  [facility]      CC:   Chief Complaint   Patient presents with    Derm Problem     Two moles on upper back.  Patient denies pain, Interested in removal options.       History of Present Illness:  Ms. Ashly Tan is a 55 year old female who presents as a return patient.    Presents for spots on back. They are bothersome to her.     Labs:      Physical exam:  Vitals: There were no vitals taken for this visit.  GEN: well developed, well-nourished, in no acute distress, in a pleasant mood.     SKIN: Hebert phototype 1  - Focused examination of the face, arms back was performed.  - Flat brown macules and patches in a sun exposed areas on face and extremities  - Stuck on brown papules on trunk and extremities   - No other lesions of concern on areas examined.      Past Medical History:   Past Medical History:   Diagnosis Date    Allergic rhinitis     vs Mechanical Rhinitis    Basal cell carcinoma (BCC) of skin of left lower extremity including hip 9/8/2022    Depressive disorder 9/27/2011    History of asthma     Occassional    Major depression in complete remission 9/27/2011    Uncomplicated asthma several years ago    Excercise induced     Past Surgical History:   Procedure Laterality Date    APPENDECTOMY      COLONOSCOPY WITH CO2 INSUFFLATION N/A 9/20/2021    Procedure: COLONOSCOPY, WITH CO2 INSUFFLATION;  Surgeon: Jenaro Camargo DO;  Location: [facility] OR    COLONOSCOPY WITH CO2 INSUFFLATION N/A 9/23/2024    Procedure: Colonoscopy with CO2 insufflation;  Surgeon: Janet Ramírez DO;  Location: [facility] OR    NO HISTORY OF SURGERY         Social History:   reports that she has never smoked. She has never been exposed to tobacco smoke. She has never used smokeless tobacco. She reports current alcohol use. She reports that she does not use drugs.    Family History:  Family History   Problem Relation Age of Onset    Depression Mother     Anxiety Disorder Mother     Thyroid Disease Mother     Skin Cancer Father     Thyroid Disease Maternal Grandmother     Circulatory Maternal Grandfather         stroke    Cerebrovascular Disease Maternal Grandfather     Diabetes Paternal Grandfather     Asthma Sister     Anxiety Disorder Sister     Asthma Sister     Thyroid Disease Sister     Depression Daughter     Anxiety Disorder Daughter     Cancer Maternal Aunt         Ovarian    Melanoma No family hx of        Medications:  Current Outpatient Medications   Medication Sig Dispense Refill    ALPRAZolam (XANAX) 0.25 MG tablet Take 1-2 tablets (0.25-0.5 mg) by mouth 3 times daily as needed for anxiety. 10 tablet 0    CALCIUM PO       cephALEXin (KEFLEX) 500 MG capsule TAKE 1 CAPSULE BY MOUTH THREE TIMES A DAY 30 capsule 5    citalopram (CELEXA) 40 MG tablet Take 1 tablet (40 mg) by mouth daily.  90 tablet 3    Ferrous Sulfate (IRON SUPPLEMENT PO)       gabapentin (NEURONTIN) 100 MG capsule Take 1 capsule (100 mg) by mouth at bedtime. 30 capsule 0    ibuprofen (ADVIL/MOTRIN) 200 MG tablet Take 200 mg by mouth every 4 hours as needed for mild pain      Magnesium Chloride (MAGNESIUM DR PO) Take by mouth daily      Pseudoephedrine-Ibuprofen (ADVIL COLD/SINUS PO)        No Known Allergies

## 2025-09-03 ENCOUNTER — OFFICE VISIT (OUTPATIENT)
Dept: DERMATOLOGY | Facility: CLINIC | Age: 55
End: 2025-09-03
Payer: COMMERCIAL

## 2025-09-03 DIAGNOSIS — L82.0 INFLAMED SEBORRHEIC KERATOSIS: ICD-10-CM

## 2025-09-03 DIAGNOSIS — L81.4 LENTIGINES: ICD-10-CM

## 2025-09-03 DIAGNOSIS — L57.8 ACTINIC SKIN DAMAGE: Primary | ICD-10-CM

## 2025-09-03 PROCEDURE — 17110 DESTRUCTION B9 LES UP TO 14: CPT | Performed by: STUDENT IN AN ORGANIZED HEALTH CARE EDUCATION/TRAINING PROGRAM

## 2025-09-03 PROCEDURE — 99212 OFFICE O/P EST SF 10 MIN: CPT | Mod: 25 | Performed by: STUDENT IN AN ORGANIZED HEALTH CARE EDUCATION/TRAINING PROGRAM

## (undated) DEVICE — PREP CHLORAPREP 26ML TINTED ORANGE  260815

## (undated) DEVICE — PAD CHUX UNDERPAD 23X24" 7136

## (undated) DEVICE — KIT ENDO FIRST STEP DISINFECTANT 200ML W/POUCH EP-4

## (undated) DEVICE — SOL WATER IRRIG 1000ML BOTTLE 07139-09

## (undated) RX ORDER — FENTANYL CITRATE 50 UG/ML
INJECTION, SOLUTION INTRAMUSCULAR; INTRAVENOUS
Status: DISPENSED
Start: 2021-09-20

## (undated) RX ORDER — FENTANYL CITRATE 50 UG/ML
INJECTION, SOLUTION INTRAMUSCULAR; INTRAVENOUS
Status: DISPENSED
Start: 2024-09-23